# Patient Record
Sex: MALE | Race: WHITE
[De-identification: names, ages, dates, MRNs, and addresses within clinical notes are randomized per-mention and may not be internally consistent; named-entity substitution may affect disease eponyms.]

---

## 2017-07-14 ENCOUNTER — HOSPITAL ENCOUNTER (EMERGENCY)
Dept: HOSPITAL 5 - ED | Age: 42
Discharge: HOME | End: 2017-07-14
Payer: SELF-PAY

## 2017-07-14 VITALS — DIASTOLIC BLOOD PRESSURE: 93 MMHG | SYSTOLIC BLOOD PRESSURE: 179 MMHG

## 2017-07-14 DIAGNOSIS — K02.9: Primary | ICD-10-CM

## 2017-07-14 PROCEDURE — 99282 EMERGENCY DEPT VISIT SF MDM: CPT

## 2017-07-14 NOTE — EMERGENCY DEPARTMENT REPORT
ED ENT HPI





- General


Chief complaint: Dental/Oral


Stated complaint: TOOTH ABCESS


Time Seen by Provider: 17 12:27


Source: patient


Mode of arrival: Ambulatory


Limitations: No Limitations





- History of Present Illness


Initial comments: 


42-year-old malesignificant past medical history other than smoking presents 

with complaint of left-sided toothache since yesterday.  Patient states he has 

had dental cavities which she has not yet dressed in his left upper dental 

molar region.  Denies any pus or blood drainage from mouth patient speaking in 

full sentences denies any fever or chills, states that his gumline feel 

swollen.  This episode started yesterday states he plans on following up with a 

dentist within the next 2 weeks.  Complaining of mild dental pain.  Patient 

denies headache dizziness nausea or abdominal pain chest pain shortness of 

breath.  A O 3


MD complaint: tooth pain


Onset/Timin


-: days(s)


Location: tooth # (13, 14)


Severity: moderate


Severity scale (0 -10): 5


Quality: aching


Consistency: intermittent


Worsens with: swallowing, eating


Context- Dental: history of dental caries, poor dental care





- Related Data


 Previous Rx's











 Medication  Instructions  Recorded  Last Taken  Type


 


Amoxicillin [Trimox CAP] 500 mg PO Q8H #30 capsule 17 Unknown Rx


 


Benzocaine [Orajel Liquid 20%] 1 ml MM Q6HR PRN #1 bottle 17 Unknown Rx


 


Chlorhexidine Mouthwash [Peridex] 118 ml MM BID #1 bottle 17 Unknown Rx


 


Ibuprofen [Motrin] 800 mg PO Q8HR PRN #25 tablet 17 Unknown Rx











 Allergies











Allergy/AdvReac Type Severity Reaction Status Date / Time


 


No Known Allergies Allergy   Unverified 17 11:50














ED Dental HPI





- General


Chief complaint: Dental/Oral


Stated complaint: TOOTH ABCESS


Time Seen by Provider: 17 12:27


Source: patient


Mode of arrival: Ambulatory


Limitations: No Limitations





- Related Data


 Previous Rx's











 Medication  Instructions  Recorded  Last Taken  Type


 


Amoxicillin [Trimox CAP] 500 mg PO Q8H #30 capsule 17 Unknown Rx


 


Benzocaine [Orajel Liquid 20%] 1 ml MM Q6HR PRN #1 bottle 17 Unknown Rx


 


Chlorhexidine Mouthwash [Peridex] 118 ml MM BID #1 bottle 17 Unknown Rx


 


Ibuprofen [Motrin] 800 mg PO Q8HR PRN #25 tablet 17 Unknown Rx











 Allergies











Allergy/AdvReac Type Severity Reaction Status Date / Time


 


No Known Allergies Allergy   Unverified 17 11:50














ED Review of Systems


ROS: 


Stated complaint: TOOTH ABCESS


Other details as noted in HPI





Constitutional: denies: chills, fever


Eyes: denies: eye pain, eye discharge, vision change


ENT: denies: ear pain, throat pain


Respiratory: denies: cough, shortness of breath, wheezing


Cardiovascular: denies: chest pain, palpitations


Endocrine: no symptoms reported


Gastrointestinal: denies: abdominal pain, nausea, diarrhea


Genitourinary: denies: urgency, dysuria


Musculoskeletal: denies: back pain, joint swelling, arthralgia


Skin: denies: rash, lesions


Neurological: denies: headache, weakness, paresthesias


Psychiatric: denies: anxiety, depression


Hematological/Lymphatic: denies: easy bleeding, easy bruising





ED Past Medical Hx





- Past Medical History


Previous Medical History?: No





- Surgical History


Past Surgical History?: No





- Social History


Smoking Status: Unknown if ever smoked


Substance Use Type: None





- Medications


Home Medications: 


 Home Medications











 Medication  Instructions  Recorded  Confirmed  Last Taken  Type


 


Amoxicillin [Trimox CAP] 500 mg PO Q8H #30 capsule 17  Unknown Rx


 


Benzocaine [Orajel Liquid 20%] 1 ml MM Q6HR PRN #1 bottle 17  Unknown Rx


 


Chlorhexidine Mouthwash [Peridex] 118 ml MM BID #1 bottle 17  Unknown Rx


 


Ibuprofen [Motrin] 800 mg PO Q8HR PRN #25 tablet 17  Unknown Rx














ED Physical Exam





- General


Limitations: No Limitations


General appearance: alert, in no apparent distress





- Head


Head exam: Present: atraumatic, normocephalic





- Eye


Eye exam: Present: normal appearance, PERRL, EOMI





- ENT


ENT exam: Present: mucous membranes moist





- Expanded ENT Exam


  ** Expanded


Teeth exam: Present: dental caries, dental tenderness # ([id number])





  __________________________














  __________________________





 1 - Dental Tenderness (pain in teeth 13 1415, visible damage to teeth with 

severe cavities)








- Neck


Neck exam: Present: normal inspection, full ROM





- Respiratory


Respiratory exam: Present: normal lung sounds bilaterally.  Absent: respiratory 

distress





- Cardiovascular


Cardiovascular Exam: Present: regular rate, normal rhythm.  Absent: systolic 

murmur, diastolic murmur, rubs, gallop





- GI/Abdominal


GI/Abdominal exam: Present: soft, normal bowel sounds





- Rectal


Rectal exam: Present: deferred





- Extremities Exam


Extremities exam: Present: normal inspection





- Back Exam


Back exam: Present: normal inspection





- Neurological Exam


Neurological exam: Present: alert, oriented X3, CN II-XII intact, normal gait





- Psychiatric


Psychiatric exam: Present: normal affect, normal mood





- Skin


Skin exam: Present: warm, dry, intact, normal color.  Absent: rash





ED Course


 Vital Signs











  17





  11:47


 


Temperature 98.6 F


 


Pulse Rate 89


 


Respiratory 18





Rate 


 


Blood Pressure 175/112


 


O2 Sat by Pulse 100





Oximetry 














ED Medical Decision Making





- Medical Decision Making


A/P: dental cavities, toothache, dental abscess


1- Motrin when necessary, amoxicillin ten-day course, Orajel when necessary, 

Peridex mouthwash daily basis, 


2- I provided patient with information for multiple dental clinics to follow up 

and stressed the importance of dental follow-up as he has multiple cavities 

that require dental fixation or instrumentation


3- no clinical signs of facial abscess, no Ry's angina, no induration or 

cellulitis of floor of mouth or tongue


4- patient able to tolerate by mouth before discharge


5- no signs of facial infection.  Advised patient that if he does not take 

antibiotics with follow-up with a dentist as soon as possible that a can result 

in potentially serious or dangerous infection to develop in jaw or face.  

Patient states that he understood these instructions.  I advised patient to 

return to the ED for any persistent unrelenting nausea or vomiting fever or 

chills or headaches, pus or blood drainage from mouth.


Critical care attestation.: 


If time is entered above; I have spent that time in minutes in the direct care 

of this critically ill patient, excluding procedure time.








ED Disposition


Clinical Impression: 


 Pain due to dental caries





Disposition:  TO HOME OR SELFCARE


Is pt being admited?: No


Does the pt Need Aspirin: No


Condition: Stable


Instructions:  Dental Abscess (ED), Dental Caries (ED), Hypertension (ED), 

Toothache (ED)


Additional Instructions: 


[email or web address]


Prescriptions: 


Amoxicillin [Trimox CAP] 500 mg PO Q8H #30 capsule


Benzocaine [Orajel Liquid 20%] 1 ml MM Q6HR PRN #1 bottle


 PRN Reason: Toothache


Chlorhexidine Mouthwash [Peridex] 118 ml MM BID #1 bottle


Ibuprofen [Motrin] 800 mg PO Q8HR PRN #25 tablet


 PRN Reason: Pain


Referrals: 


[facility] [Outside] - 3-5 Days


JONATHAN BARRIENTOS MD [Staff Physician] - 3-5 Days


[facility] [Provider Group] - 3-5 Days


[facility] [Provider Group] - 3-5 Days


Forms:  Work/School Release Form(ED)


Time of Disposition: 12:51

## 2018-09-19 ENCOUNTER — HOSPITAL ENCOUNTER (INPATIENT)
Dept: HOSPITAL 5 - ED | Age: 43
LOS: 5 days | Discharge: HOME HEALTH | DRG: 871 | End: 2018-09-24
Attending: INTERNAL MEDICINE | Admitting: INTERNAL MEDICINE
Payer: COMMERCIAL

## 2018-09-19 DIAGNOSIS — F12.90: ICD-10-CM

## 2018-09-19 DIAGNOSIS — K57.30: ICD-10-CM

## 2018-09-19 DIAGNOSIS — N17.0: ICD-10-CM

## 2018-09-19 DIAGNOSIS — A41.9: Primary | ICD-10-CM

## 2018-09-19 DIAGNOSIS — K57.20: ICD-10-CM

## 2018-09-19 DIAGNOSIS — R65.10: ICD-10-CM

## 2018-09-19 LAB
ALBUMIN SERPL-MCNC: 3.4 G/DL (ref 3.9–5)
ALT SERPL-CCNC: 13 UNITS/L (ref 7–56)
BASOPHILS # (AUTO): 0 K/MM3 (ref 0–0.1)
BASOPHILS NFR BLD AUTO: 0.2 % (ref 0–1.8)
BILIRUB UR QL STRIP: (no result)
BLOOD UR QL VISUAL: (no result)
BUN SERPL-MCNC: 61 MG/DL (ref 9–20)
BUN/CREAT SERPL: 28 %
CALCIUM SERPL-MCNC: 9.2 MG/DL (ref 8.4–10.2)
EOSINOPHIL # BLD AUTO: 0 K/MM3 (ref 0–0.4)
EOSINOPHIL NFR BLD AUTO: 0 % (ref 0–4.3)
HCT VFR BLD CALC: 45.3 % (ref 35.5–45.6)
HEMOLYSIS INDEX: 12
HGB BLD-MCNC: 15.1 GM/DL (ref 11.8–15.2)
LYMPHOCYTES # BLD AUTO: 0.5 K/MM3 (ref 1.2–5.4)
LYMPHOCYTES NFR BLD AUTO: 4 % (ref 13.4–35)
MCH RBC QN AUTO: 30 PG (ref 28–32)
MCHC RBC AUTO-ENTMCNC: 33 % (ref 32–34)
MCV RBC AUTO: 91 FL (ref 84–94)
MONOCYTES # (AUTO): 1.1 K/MM3 (ref 0–0.8)
MONOCYTES % (AUTO): 9.5 % (ref 0–7.3)
MUCOUS THREADS #/AREA URNS HPF: (no result) /HPF
PH UR STRIP: 5 [PH] (ref 5–7)
PLATELET # BLD: 321 K/MM3 (ref 140–440)
RBC # BLD AUTO: 4.97 M/MM3 (ref 3.65–5.03)
RBC #/AREA URNS HPF: 2 /HPF (ref 0–6)
UROBILINOGEN UR-MCNC: < 2 MG/DL (ref ?–2)
WBC #/AREA URNS HPF: 5 /HPF (ref 0–6)

## 2018-09-19 PROCEDURE — 80048 BASIC METABOLIC PNL TOTAL CA: CPT

## 2018-09-19 PROCEDURE — C9113 INJ PANTOPRAZOLE SODIUM, VIA: HCPCS

## 2018-09-19 PROCEDURE — 80053 COMPREHEN METABOLIC PANEL: CPT

## 2018-09-19 PROCEDURE — 85025 COMPLETE CBC W/AUTO DIFF WBC: CPT

## 2018-09-19 PROCEDURE — 96375 TX/PRO/DX INJ NEW DRUG ADDON: CPT

## 2018-09-19 PROCEDURE — 96365 THER/PROPH/DIAG IV INF INIT: CPT

## 2018-09-19 PROCEDURE — 10160 PNXR ASPIR ABSC HMTMA BULLA: CPT

## 2018-09-19 PROCEDURE — C1769 GUIDE WIRE: HCPCS

## 2018-09-19 PROCEDURE — 82962 GLUCOSE BLOOD TEST: CPT

## 2018-09-19 PROCEDURE — 81001 URINALYSIS AUTO W/SCOPE: CPT

## 2018-09-19 PROCEDURE — 87040 BLOOD CULTURE FOR BACTERIA: CPT

## 2018-09-19 PROCEDURE — 77012 CT SCAN FOR NEEDLE BIOPSY: CPT

## 2018-09-19 PROCEDURE — 96361 HYDRATE IV INFUSION ADD-ON: CPT

## 2018-09-19 PROCEDURE — 36415 COLL VENOUS BLD VENIPUNCTURE: CPT

## 2018-09-19 PROCEDURE — 87116 MYCOBACTERIA CULTURE: CPT

## 2018-09-19 PROCEDURE — 74177 CT ABD & PELVIS W/CONTRAST: CPT

## 2018-09-19 PROCEDURE — 82140 ASSAY OF AMMONIA: CPT

## 2018-09-19 PROCEDURE — 85027 COMPLETE CBC AUTOMATED: CPT

## 2018-09-19 PROCEDURE — 74176 CT ABD & PELVIS W/O CONTRAST: CPT

## 2018-09-19 PROCEDURE — 96372 THER/PROPH/DIAG INJ SC/IM: CPT

## 2018-09-19 RX ADMIN — Medication SCH ML: at 23:18

## 2018-09-19 RX ADMIN — METRONIDAZOLE SCH MLS/HR: 5 INJECTION, SOLUTION INTRAVENOUS at 23:18

## 2018-09-19 RX ADMIN — SODIUM CHLORIDE SCH MLS/HR: 0.9 INJECTION, SOLUTION INTRAVENOUS at 18:54

## 2018-09-19 NOTE — CONSULTATION
History of Present Illness


Consult date: 09/19/18


Chief complaint: 





abdominal pain





- History of present illness


History of present illness: 





42 yo M with no PMHx presents to ER with 2 days of crampy suprapubic abdominal 

pain. He states that the pain was moderate in intensity upon onset and has 

gradually improved. It does not radiate. The pain was associated with nausea 

and so the patient forced himself to drink water and vomit until the emesis was 

clear. He also tried some charcoal to help with the pain. He states he was 

having normal BMs and the last BM was loose. He is passing flatus. He admits to 

intermittent chills. No measured fever. No sick contacts. He has never had 

symptoms like this in the past. He has never had a colonoscopy. He also admits 

to pain with urination and difficulty urinating. He has not had a solid meal in 

2 days but has been drinking water.





At this time the patient states his pain is better. He is not nauseated. 





Past History


Past Medical History: No medical history


Past Surgical History: No surgical history


Social history: smoking (marijuana).  denies: alcohol abuse, prescription drug 

abuse


Family history: no significant family history (no family history of colon or Gi 

malignancy)





Medications and Allergies


 Allergies











Allergy/AdvReac Type Severity Reaction Status Date / Time


 


No Known Allergies Allergy   Unverified 07/14/17 11:50











 Home Medications











 Medication  Instructions  Recorded  Confirmed  Last Taken  Type


 


No Known Home Medications [No  09/19/18 09/19/18 Unknown History





Reported Home Medications]     











Active Meds: 


Active Medications





Acetaminophen (Tylenol)  650 mg PO Q4H PRN


   PRN Reason: Pain MILD(1-3)/Fever >100.5/HA


Albuterol (Proventil)  2.5 mg IH Q4HRT PRN


   PRN Reason: Shortness Of Breath


Piperacillin Sod/Tazobactam Sod (Zosyn/Ns 4.5gm/100ml)  4.5 gm in 100 mls @ 200 

mls/hr IV ONCE DAGO


   Last Admin: 09/19/18 15:31 Dose:  200 mls/hr


Levofloxacin/Dextrose (Levaquin 500mg/100ml)  500 mg in 100 mls @ 100 mls/hr IV 

PREOP NR; Protocol


   Stop: 09/20/18 06:00


Metronidazole (Flagyl 500 Mg/100 Ml)  500 mg in 100 mls @ 200 mls/hr IV ONCE DAGO

; Protocol


Sodium Chloride (Nacl 0.9% 1000 Ml)  1,000 mls @ 150 mls/hr IV ONCE ONE


   Stop: 09/19/18 22:06


Metronidazole (Flagyl 500 Mg/100 Ml)  500 mg in 100 mls @ 100 mls/hr IV Q8HR DAGO

; Protocol


Sodium Chloride (Nacl 0.9% 1000 Ml)  1,000 mls @ 125 mls/hr IV AS DIRECT DAGO


Levofloxacin/Dextrose (Levaquin 500mg/100ml)  500 mg in 100 mls @ 100 mls/hr IV 

Q24HR DAGO; Protocol


Morphine Sulfate (Morphine)  2 mg IV Q4H PRN


   PRN Reason: Pain, Moderate (4-6)


Ondansetron HCl (Zofran)  4 mg IV Q8H PRN


   PRN Reason: Nausea And Vomiting


Sodium Chloride (Sodium Chloride Flush Syringe 10 Ml)  10 ml IV BID DAGO


Sodium Chloride (Sodium Chloride Flush Syringe 10 Ml)  10 ml IV PRN PRN


   PRN Reason: LINE FLUSH











Review of Systems


All systems: negative (10 pt ROS performed and negative except for that listed 

in HPI)





Exam


 Vital Signs











Temp Pulse Resp BP Pulse Ox


 


 97.9 F   115 H  16   139/93   93 


 


 09/19/18 12:12  09/19/18 12:12  09/19/18 12:12  09/19/18 12:12  09/19/18 12:12











Narrative exam: 





Gen: AAOx3. NAD


ENT: no scleral icterus or conjunctival pallor


CV: S1, s2+


Resp: even and unlabored


Abd: soft, ND, mild TTP in suprapubic region and LLQ. no r/r/g


Ext: no c/c/e





Results





- Labs





 09/19/18 13:15





 09/19/18 13:15


 Abnormal lab results











  09/19/18 09/19/18 Range/Units





  13:15 13:15 


 


WBC  11.5 H   (4.5-11.0)  K/mm3


 


Lymph % (Auto)  4.0 L   (13.4-35.0)  %


 


Mono % (Auto)  9.5 H   (0.0-7.3)  %


 


Lymph #  0.5 L   (1.2-5.4)  K/mm3


 


Mono #  1.1 H   (0.0-0.8)  K/mm3


 


Seg Neutrophils %  86.3 H   (40.0-70.0)  %


 


Seg Neutrophils #  9.9 H   (1.8-7.7)  K/mm3


 


Sodium   136 L  (137-145)  mmol/L


 


Chloride   91.3 L  ()  mmol/L


 


BUN   61 H  (9-20)  mg/dL


 


Creatinine   2.2 H  (0.8-1.5)  mg/dL


 


Glucose   128 H  ()  mg/dL


 


Albumin   3.4 L  (3.9-5)  g/dL








 Diabetes panel











  09/19/18 Range/Units





  13:15 


 


Sodium  136 L  (137-145)  mmol/L


 


Potassium  4.0  (3.6-5.0)  mmol/L


 


Chloride  91.3 L  ()  mmol/L


 


Carbon Dioxide  26  (22-30)  mmol/L


 


BUN  61 H  (9-20)  mg/dL


 


Creatinine  2.2 H  (0.8-1.5)  mg/dL


 


Glucose  128 H  ()  mg/dL


 


Calcium  9.2  (8.4-10.2)  mg/dL


 


AST  16  (5-40)  units/L


 


ALT  13  (7-56)  units/L


 


Alkaline Phosphatase  56  ()  units/L


 


Total Protein  8.2  (6.3-8.2)  g/dL


 


Albumin  3.4 L  (3.9-5)  g/dL








 Calcium panel











  09/19/18 Range/Units





  13:15 


 


Calcium  9.2  (8.4-10.2)  mg/dL


 


Albumin  3.4 L  (3.9-5)  g/dL








 Pituitary panel











  09/19/18 Range/Units





  13:15 


 


Sodium  136 L  (137-145)  mmol/L


 


Potassium  4.0  (3.6-5.0)  mmol/L


 


Chloride  91.3 L  ()  mmol/L


 


Carbon Dioxide  26  (22-30)  mmol/L


 


BUN  61 H  (9-20)  mg/dL


 


Creatinine  2.2 H  (0.8-1.5)  mg/dL


 


Glucose  128 H  ()  mg/dL


 


Calcium  9.2  (8.4-10.2)  mg/dL








 Adrenal panel











  09/19/18 Range/Units





  13:15 


 


Sodium  136 L  (137-145)  mmol/L


 


Potassium  4.0  (3.6-5.0)  mmol/L


 


Chloride  91.3 L  ()  mmol/L


 


Carbon Dioxide  26  (22-30)  mmol/L


 


BUN  61 H  (9-20)  mg/dL


 


Creatinine  2.2 H  (0.8-1.5)  mg/dL


 


Glucose  128 H  ()  mg/dL


 


Calcium  9.2  (8.4-10.2)  mg/dL


 


Total Bilirubin  0.90  (0.1-1.2)  mg/dL


 


AST  16  (5-40)  units/L


 


ALT  13  (7-56)  units/L


 


Alkaline Phosphatase  56  ()  units/L


 


Total Protein  8.2  (6.3-8.2)  g/dL


 


Albumin  3.4 L  (3.9-5)  g/dL














- Imaging


CT scan - abdomen: report reviewed, image reviewed


CT scan - pelvis: report reviewed, image reviewed





Assessment and Plan





42 yo M with 





1. pneumoperitoneum, likely perforated diverticulitis


2. ALONSO likely secondary to dehydration





Plan;





1. Based on the patients clinical findings, he is stable without peritoneal 

signs or significant abdominal pain. The perforation has likely sealed off. We 

will admit patient to hospitalist service


2. strict NPO


3. IVF - NS@150cc/hr. Pt received multiple 1L NS boluses in ER


4. prn pain and nausea control


5. repeat CT scan A/P with oral, rectal, and IV contrast (if crt improves with 

resuscitation) in AM


6. DVT ppx


7. activity as tolerated


8. serial abdominal exams. If patient does not improve clinically or abdominal 

exam/labs worsen, he may require diagnostic laparoscopy, washout. I discussed 

the options of surgery and conservative management with the patient and he 

understands and is agreeable to the plan.





Thank you for this consultation, please call with questions or concerns.

## 2018-09-19 NOTE — CAT SCAN REPORT
CT ABDOMEN PELVIS WITHOUT CONTRAST:



HISTORY:  Suprapubic pain, nausea, vomiting and diarrhea.



COMPARISON: none.



TECHNIQUE:  Helical CT in 1.25mm intervals without IV contrast. 

Sagittal and coronal reconstructions. 





FINDINGS:



Lung bases: Normal.



Liver: Normal.



Biliary system: There is suggestion of some sludge layering in the 

gallbladder. No calcified gallstones. No biliary dilatation or 

inflammation.



Pancreas: Normal.



Spleen: Normal.



Kidneys/ureters/bladder: The kidneys are normal size, contour and 

position. A punctate calyceal stone is identified at the inferior pole 

the right kidney. A 1 cm cyst is noted near mid pole of the right 

kidney. No evidence for ureteral stones or hydronephrosis. The bladder 

is partially complex but diffuse bladder wall thickening is evident 

measuring up to 1.2 cm. Cystitis could be considered.



Adrenal glands: Normal.



Aorta: Normal.



Intestines: No oral contrast was administered which limits this exam. 

Free air is identified along the anterior abdominal wall spanning from 

the liver to the pelvis. Bowel perforation is highly suspected. The 

site of perforation appears to be in the sigmoid region where there are 

numerous diverticula. The sigmoid colon is thickened as well. A 

perforated diverticulitis could be considered.



Appendix: Normal.



Ascites: There is small to medium fluid in the pelvis and between bowel 

loops in the mesentery. No obvious encapsulated mature abscess.



Adenopathy: None.



Musculoskeletal: Normal.





IMPRESSION:

Free air in the abdomen consistent with visceral perforation. I suspect 

this may be secondary to a perforated diverticulum or diverticulitis.

Punctate right renal calculus.

Thickened bladder wall, correlate for cystitis.

Mild degree of fluid in the abdomen as described.

Probable sludge in the gallbladder.



These findings were discussed with Dr. Walls in the emergency 

department at 1404 hrs.

## 2018-09-19 NOTE — HISTORY AND PHYSICAL REPORT
History of Present Illness


Chief complaint: 





My stomach hurts


History of present illness: 


42 YO Male with No PMH presents to ED for evaluation. Pt states that he has 

experienced abdominal pain over the past 3 days with persistent symptoms over 

the same time frame. Pt states that pain is 6/10, crampy in nature, associated 

with nausea, and 3-4 episodes of vomiting, and one loose stools. Pt denies fever

, chills, CP, Palpitations, Syncope, Trauma, BRBPR, unintentional weight loss, 

night sweats, BRBPR, hematuria, flank pain, or recent ill contacts. Pt seen and 

evaluated in ED and found to have SIRS, ARF, Diverticulosis with Perforated 

Viscus. Surgery consulted in ED. 








Past History


Past Medical History: No medical history, other (reviewed)


Past Surgical History: No surgical history, Other (reviewed)


Social history: single.  denies: smoking, alcohol abuse, prescription drug abuse


Family history: no significant family history (reviewed)





Medications and Allergies


 Allergies











Allergy/AdvReac Type Severity Reaction Status Date / Time


 


No Known Allergies Allergy   Unverified 07/14/17 11:50











 Home Medications











 Medication  Instructions  Recorded  Confirmed  Last Taken  Type


 


No Known Home Medications [No  09/19/18 09/19/18 Unknown History





Reported Home Medications]     











Active Meds: 


Active Medications





Piperacillin Sod/Tazobactam Sod (Zosyn/Ns 4.5gm/100ml)  4.5 gm in 100 mls @ 200 

mls/hr IV ONCE DAGO


Sodium Chloride (Nacl 0.9% 1000 Ml)  1,000 mls @ 999 mls/hr IV BOLUS ONE


   Stop: 09/19/18 15:27











Review of Systems


Constitutional: no weight loss, no weight gain, no fever, no chills, no sweats


Ears, nose, mouth and throat: no ear pain, no ear discharge, no tinnitis, no 

decreased hearing, no nose pain, no nasal congestion, no nasal discharge, no 

sinus pressure


Cardiovascular: no chest pain, no orthopnea, no palpitations, no rapid/

irregular heart beat, no edema, no syncope


Respiratory: no cough, no cough with sputum, no excessive sputum, no hemoptysis


Gastrointestinal: nausea, vomiting


Rectal: no pain, no incontinence, no bleeding


Musculoskeletal: no neck stiffness, no neck pain, no shooting arm pain, no arm 

numbness/tingling, no low back pain


Integumentary: no rash, no pruritis, no redness, no sores, no wounds


Neurological: no head injury, no transient paralysis, no paralysis, no weakness

, no parathesias, no numbness, no tingling


Psychiatric: no anxiety, no memory loss, no change in sleep habits, no sleep 

disturbances, no insomnia, no hypersomnia, no change in appetite, no change in 

libido


Endocrine: no cold intolerance, no heat intolerance, no polyphagia, no 

excessive thirst, no polydipsia, no polyuria, no nocturia, no excessive sweating


Hematologic/Lymphatic: no easy bruising, no easy bleeding, no lymphadenopathy, 

no lymphedema


Allergic/Immunologic: no urticaria, no allergic rhinitis, no wheezing, no 

persistent infections, no anaphylaxis





Exam





- Constitutional


Vitals: 


 











Temp Pulse Resp BP Pulse Ox


 


 97.9 F   115 H  18   139/93   93 


 


 09/19/18 12:12  09/19/18 12:12  09/19/18 13:32  09/19/18 12:12  09/19/18 12:12











General appearance: Present: mild distress





- EENT


Eyes: Present: PERRL


ENT: hearing intact, clear oral mucosa





- Neck


Neck: Present: supple, normal ROM





- Respiratory


Respiratory effort: normal


Respiratory: bilateral: CTA





- Cardiovascular


Heart Sounds: Present: S1 & S2.  Absent: rub, click





- Extremities


Extremities: pulses symmetrical, No edema


Peripheral Pulses: within normal limits





- Abdominal


General gastrointestinal: Present: soft, non-tender, non-distended, normal 

bowel sounds


Male genitourinary: Present: normal





- Integumentary


Integumentary: Present: clear, warm, dry





- Musculoskeletal


Musculoskeletal: gait normal, strength equal bilaterally





- Psychiatric


Psychiatric: appropriate mood/affect, intact judgment & insight





- Neurologic


Neurologic: CNII-XII intact, moves all extremities





Results





- Labs


CBC & Chem 7: 


 09/19/18 13:15





 09/19/18 13:15


Labs: 


 Abnormal lab results











  09/19/18 09/19/18 Range/Units





  13:15 13:15 


 


WBC  11.5 H   (4.5-11.0)  K/mm3


 


Lymph % (Auto)  4.0 L   (13.4-35.0)  %


 


Mono % (Auto)  9.5 H   (0.0-7.3)  %


 


Lymph #  0.5 L   (1.2-5.4)  K/mm3


 


Mono #  1.1 H   (0.0-0.8)  K/mm3


 


Seg Neutrophils %  86.3 H   (40.0-70.0)  %


 


Seg Neutrophils #  9.9 H   (1.8-7.7)  K/mm3


 


Sodium   136 L  (137-145)  mmol/L


 


Chloride   91.3 L  ()  mmol/L


 


BUN   61 H  (9-20)  mg/dL


 


Creatinine   2.2 H  (0.8-1.5)  mg/dL


 


Glucose   128 H  ()  mg/dL


 


Albumin   3.4 L  (3.9-5)  g/dL














Assessment and Plan





- Patient Problems


(1) SIRS (systemic inflammatory response syndrome)


Current Visit: Yes   Status: Acute   


Plan to address problem: 


IV antibiotic therapy, blood cultures, CBC, CMP, chest x ray, urinalysis, 

serial lactic acid.








(2) Diverticulosis


Current Visit: Yes   Status: Acute   


Qualifiers: 


   Diverticulosis site: diverticulosis of large intestine 


Plan to address problem: 


CT Abdomen pelvis, IVF resuscitation, serial abdominal exam, NPO, 








(3) Acute renal failure


Current Visit: Yes   Status: Acute   


Qualifiers: 


   Acute renal failure type: with acute tubular necrosis   Qualified Code(s): 

N17.0 - Acute kidney failure with tubular necrosis   


Plan to address problem: 


IVF resuscitation, monitor uop q shift, supportive care.








(4) Perforated diverticulum


Current Visit: Yes   Status: Acute   


Plan to address problem: 


CT Abdomen pelvis, serial abdominal exam, surgery consulted, NPO, IVF 

resuscitation. 








(5) DVT prophylaxis


Current Visit: Yes   Status: Acute   


Plan to address problem: 


SCD to BLE while in bed.

## 2018-09-19 NOTE — ANESTHESIA CONSULTATION
Anesthesia Consult and Med Hx


Date of service: 09/19/18





- Airway


Anesthetic Teeth Evaluation: Poor


ROM Head & Neck: Adequate


Mental/Hyoid Distance: Adequate


Mallampati Class: Class I


Intubation Access Assessment: Good





- Pulmonary Exam


CTA: No (exp rhoncii)





- Cardiac Exam


Cardiac Exam: RRR





- Pre-Operative Health Status


ASA Pre-Surgery Classification: ASA2


Proposed Anesthetic Plan: MAC





- Pre-Anesthesia Comment


Pre-Anesthesia Comments: admitted with abdominal pain. CT impression-free air 

consistent with perforated visceral, may be secondary to diverticulum/

diverticulitis.





- Pulmonary


Hx Smoking: Yes (marijuana )


Hx Respiratory Symptoms: Yes (recent cold 7 days ago, productive cough )





- Endocrine


Hx Renal Disease: No (elevated creatinine, 2.2 )

## 2018-09-19 NOTE — EMERGENCY DEPARTMENT REPORT
ED Abdominal Pain HPI





- General


Chief Complaint: Abdominal Pain


Stated Complaint: LOWER ABD PAIN


Time Seen by Provider: 18 12:40


Source: patient


Mode of arrival: Wheelchair


Limitations: No Limitations





- History of Present Illness


Initial Comments: 








Patient is a 43-year-old Male who's had 3 days of central suprapubic abdominal 

discomfort.  Patient states a crampy type pain.  He's had 3-4 episodes of 

vomiting.  Patient also had one bout of loose stools this morning.  Patient 

states 3 days ago he had one episode of dysuria that was was excruciating 

however this is resolved.  Patient denies any fevers chills at this time.  


MD Complaint: abdominal pain


-: Gradual (3 days)


Severity scale (0 -10): 6


Improves With: nothing


Worsens With: nothing


Associated Symptoms: nausea, vomiting, diarrhea.  denies: hematemesis, 

hematochezia





- Related Data


 Home Medications











 Medication  Instructions  Recorded  Confirmed  Last Taken


 


No Known Home Medications [No  18 Unknown





Reported Home Medications]    











 Allergies











Allergy/AdvReac Type Severity Reaction Status Date / Time


 


No Known Allergies Allergy   Unverified 17 11:50














ED Review of Systems


ROS: 


Stated complaint: LOWER ABD PAIN


Other details as noted in HPI





Comment: All other systems reviewed and negative





ED Past Medical Hx





- Past Medical History


Previous Medical History?: No





- Surgical History


Past Surgical History?: No





- Social History


Smoking Status: Never Smoker


Substance Use Type: Marijuana





- Medications


Home Medications: 


 Home Medications











 Medication  Instructions  Recorded  Confirmed  Last Taken  Type


 


No Known Home Medications [No  18 Unknown History





Reported Home Medications]     














ED Physical Exam





- General


Limitations: No Limitations


General appearance: alert, in no apparent distress, cachectic





- Head


Head exam: Present: atraumatic, normocephalic





- Eye


Eye exam: Present: normal appearance





- ENT


ENT exam: Present: mucous membranes moist





- Neck


Neck exam: Present: normal inspection





- Respiratory


Respiratory exam: Present: normal lung sounds bilaterally.  Absent: respiratory 

distress, wheezes, rales, rhonchi





- Cardiovascular


Cardiovascular Exam: Present: regular rate, normal rhythm.  Absent: systolic 

murmur, diastolic murmur, rubs, gallop





- GI/Abdominal


GI/Abdominal exam: Present: soft, tenderness (suprapubic), hypoactive bowel 

sounds.  Absent: distended, guarding, rebound, rigid, normal bowel sounds





- Rectal


Rectal exam: Present: deferred





- Extremities Exam


Extremities exam: Present: normal inspection





- Back Exam


Back exam: Present: normal inspection





- Neurological Exam


Neurological exam: Present: alert, oriented X3





- Psychiatric


Psychiatric exam: Present: normal affect, normal mood





- Skin


Skin exam: Present: warm, dry, intact, normal color.  Absent: rash





ED Course


 Vital Signs











  18





  12:12 12:34 13:19


 


Temperature 97.9 F  


 


Pulse Rate 115 H  


 


Respiratory 16 14 15





Rate   


 


Blood Pressure 139/93  


 


O2 Sat by Pulse 93  





Oximetry   














  18





  13:32


 


Temperature 


 


Pulse Rate 


 


Respiratory 18





Rate 


 


Blood Pressure 


 


O2 Sat by Pulse 





Oximetry 














ED Medical Decision Making





- Lab Data


Result diagrams: 


 18 13:15





 18 13:15








 Lab Results











  18 Range/Units





  13:15 13:15 13:49 


 


WBC  11.5 H    (4.5-11.0)  K/mm3


 


RBC  4.97    (3.65-5.03)  M/mm3


 


Hgb  15.1    (11.8-15.2)  gm/dl


 


Hct  45.3    (35.5-45.6)  %


 


MCV  91    (84-94)  fl


 


MCH  30    (28-32)  pg


 


MCHC  33    (32-34)  %


 


RDW  14.0    (13.2-15.2)  %


 


Plt Count  321    (140-440)  K/mm3


 


Lymph % (Auto)  4.0 L    (13.4-35.0)  %


 


Mono % (Auto)  9.5 H    (0.0-7.3)  %


 


Eos % (Auto)  0.0    (0.0-4.3)  %


 


Baso % (Auto)  0.2    (0.0-1.8)  %


 


Lymph #  0.5 L    (1.2-5.4)  K/mm3


 


Mono #  1.1 H    (0.0-0.8)  K/mm3


 


Eos #  0.0    (0.0-0.4)  K/mm3


 


Baso #  0.0    (0.0-0.1)  K/mm3


 


Seg Neutrophils %  86.3 H    (40.0-70.0)  %


 


Seg Neutrophils #  9.9 H    (1.8-7.7)  K/mm3


 


Sodium   136 L   (137-145)  mmol/L


 


Potassium   4.0   (3.6-5.0)  mmol/L


 


Chloride   91.3 L   ()  mmol/L


 


Carbon Dioxide   26   (22-30)  mmol/L


 


Anion Gap   23   mmol/L


 


BUN   61 H   (9-20)  mg/dL


 


Creatinine   2.2 H   (0.8-1.5)  mg/dL


 


Estimated GFR   33   ml/min


 


BUN/Creatinine Ratio   28   %


 


Glucose   128 H   ()  mg/dL


 


Calcium   9.2   (8.4-10.2)  mg/dL


 


Total Bilirubin   0.90   (0.1-1.2)  mg/dL


 


AST   16   (5-40)  units/L


 


ALT   13   (7-56)  units/L


 


Alkaline Phosphatase   56   ()  units/L


 


Total Protein   8.2   (6.3-8.2)  g/dL


 


Albumin   3.4 L   (3.9-5)  g/dL


 


Albumin/Globulin Ratio   0.7   %


 


Urine Color    Yellow  (Yellow)  


 


Urine Turbidity    Cloudy  (Clear)  


 


Urine pH    5.0  (5.0-7.0)  


 


Ur Specific Gravity    1.020  (1.003-1.030)  


 


Urine Protein    100 mg/dl  (Negative)  mg/dL


 


Urine Glucose (UA)    Neg  (Negative)  mg/dL


 


Urine Ketones    Tr  (Negative)  mg/dL


 


Urine Blood    Mod  (Negative)  


 


Urine Nitrite    Neg  (Negative)  


 


Urine Bilirubin    Neg  (Negative)  


 


Urine Urobilinogen    < 2.0  (<2.0)  mg/dL


 


Ur Leukocyte Esterase    Neg  (Negative)  


 


Urine WBC (Auto)    5.0  (0.0-6.0)  /HPF


 


Urine RBC (Auto)    2.0  (0.0-6.0)  /HPF


 


U Epithel Cells (Auto)    1.0  (0-13.0)  /HPF


 


Urine Mucus    Few  /HPF














- Radiology Data





Patient: VINICIO CARMONA MR#: [id number] 


: 1975 Acct:[id number] 


Age/Sex: 43 / M ADM Date: 18 


Loc: ED 


Attending Dr: 








Ordering Physician: DANISHA WALLS MD 


Date of Service: 18 


Procedure(s): CT abdomen pelvis wo con 


Accession Number(s): [id number] 





cc: DANISHA WALLS MD 








CT ABDOMEN PELVIS WITHOUT CONTRAST: 





HISTORY: Suprapubic pain, nausea, vomiting and diarrhea. 





COMPARISON: none. 





TECHNIQUE: Helical CT in 1.25mm intervals without IV contrast. 


Sagittal and coronal reconstructions. 








FINDINGS: 





Lung bases: Normal. 





Liver: Normal. 





Biliary system: There is suggestion of some sludge layering in the 


gallbladder. No calcified gallstones. No biliary dilatation or 


inflammation. 





Pancreas: Normal. 





Spleen: Normal. 





Kidneys/ureters/bladder: The kidneys are normal size, contour and 


position. A punctate calyceal stone is identified at the inferior pole 


the right kidney. A 1 cm cyst is noted near mid pole of the right 


kidney. No evidence for ureteral stones or hydronephrosis. The bladder 


is partially complex but diffuse bladder wall thickening is evident 


measuring up to 1.2 cm. Cystitis could be considered. 





Adrenal glands: Normal. 





Aorta: Normal. 





Intestines: No oral contrast was administered which limits this exam. 


Free air is identified along the anterior abdominal wall spanning from 


the liver to the pelvis. Bowel perforation is highly suspected. The 


site of perforation appears to be in the sigmoid region where there are 


numerous diverticula. The sigmoid colon is thickened as well. A 


perforated diverticulitis could be considered. 





Appendix: Normal. 





Ascites: There is small to medium fluid in the pelvis and between bowel 


loops in the mesentery. No obvious encapsulated mature abscess. 





Adenopathy: None. 





Musculoskeletal: Normal. 








IMPRESSION: 


Free air in the abdomen consistent with visceral perforation. I suspect 


this may be secondary to a perforated diverticulum or diverticulitis. 


Punctate right renal calculus. 


Thickened bladder wall, correlate for cystitis. 


Mild degree of fluid in the abdomen as described. 


Probable sludge in the gallbladder. 





These findings were discussed with Dr. Walls in the emergency 


department at 1404 hrs. 





Transcribed By: TTR 


Dictated By: FAHAD RYAN JR, MD 


Electronically Authenticated By: FAHAD RYAN JR, MD 


Signed Date/Time: 18 1409 





- Medical Decision Making





Despite the fact that the patient's abdomen is soft patient's CT does have 

extensive free air consistent with a perforated diverticulum.  The patient has 

slight elevation of white count.  Patient also has some renal failure is 

present.  Patient does not have a history of renal failure.  Patient started on 

aggressive IV fluids.  Patient started on sepsis protocol.  Patient given a 

dose of Zosyn and  with general surgery has been a consulted





Patient to be admitted to the hospitalist service under Dr. Hernandez





Critical Care Time: Yes (30)


Critical care attestation.: 


If time is entered above; I have spent that time in minutes in the direct care 

of this critically ill patient, excluding procedure time.








ED Disposition


Clinical Impression: 


 Perforated diverticulum, Acute renal failure





Disposition:  OP ADMIT IP TO THIS HOSP


Is pt being admited?: Yes


Does the pt Need Aspirin: No


Condition: Stable


Referrals: 


PRIMARY CARE,MD [Primary Care Provider] - 3-5 Days


Time of Disposition: 15:29

## 2018-09-19 NOTE — EMERGENCY DEPARTMENT REPORT
Blank Doc





- Documentation


Documentation: 





Patient is a 43-year-old Male who's had 3 days of central suprapubic abdominal 

discomfort.  Patient states a crampy type pain.  He's had 3-4 episodes of 

vomiting.  Patient also had one bout of loose stools this morning.  Patient 

states 3 days ago he had one episode of dysuria that was was excruciating 

however this is resolved.  Patient denies any fevers chills at this time.  

Patient on focused physical exam does have some suprapubic and right lower 

quadrant discomfort.  There is no rebound or guarding.  Patient will have a CT 

of the abdomen and pelvis performed as well as urinalysis and blood work.  

Patient is given meds for symptomatic relief

## 2018-09-20 LAB
BASOPHILS # (AUTO): 0 K/MM3 (ref 0–0.1)
BASOPHILS NFR BLD AUTO: 0.2 % (ref 0–1.8)
BUN SERPL-MCNC: 50 MG/DL (ref 9–20)
BUN/CREAT SERPL: 36 %
CALCIUM SERPL-MCNC: 8.3 MG/DL (ref 8.4–10.2)
EOSINOPHIL # BLD AUTO: 0 K/MM3 (ref 0–0.4)
EOSINOPHIL NFR BLD AUTO: 0 % (ref 0–4.3)
HCT VFR BLD CALC: 37.7 % (ref 35.5–45.6)
HEMOLYSIS INDEX: 38
HGB BLD-MCNC: 12.7 GM/DL (ref 11.8–15.2)
LYMPHOCYTES # BLD AUTO: 0.4 K/MM3 (ref 1.2–5.4)
LYMPHOCYTES NFR BLD AUTO: 3.3 % (ref 13.4–35)
MCH RBC QN AUTO: 31 PG (ref 28–32)
MCHC RBC AUTO-ENTMCNC: 34 % (ref 32–34)
MCV RBC AUTO: 91 FL (ref 84–94)
MONOCYTES # (AUTO): 1 K/MM3 (ref 0–0.8)
MONOCYTES % (AUTO): 7.6 % (ref 0–7.3)
PLATELET # BLD: 296 K/MM3 (ref 140–440)
RBC # BLD AUTO: 4.16 M/MM3 (ref 3.65–5.03)

## 2018-09-20 RX ADMIN — SODIUM CHLORIDE SCH MLS/HR: 0.9 INJECTION, SOLUTION INTRAVENOUS at 04:10

## 2018-09-20 RX ADMIN — DEXTROSE AND SODIUM CHLORIDE SCH MLS/HR: 5; .9 INJECTION, SOLUTION INTRAVENOUS at 21:30

## 2018-09-20 RX ADMIN — Medication SCH ML: at 21:27

## 2018-09-20 RX ADMIN — METRONIDAZOLE SCH MLS/HR: 5 INJECTION, SOLUTION INTRAVENOUS at 05:28

## 2018-09-20 RX ADMIN — Medication SCH ML: at 09:51

## 2018-09-20 RX ADMIN — METRONIDAZOLE SCH MLS/HR: 5 INJECTION, SOLUTION INTRAVENOUS at 13:43

## 2018-09-20 RX ADMIN — METRONIDAZOLE SCH MLS/HR: 5 INJECTION, SOLUTION INTRAVENOUS at 21:27

## 2018-09-20 RX ADMIN — DEXTROSE AND SODIUM CHLORIDE SCH MLS/HR: 5; .9 INJECTION, SOLUTION INTRAVENOUS at 14:04

## 2018-09-20 NOTE — PROGRESS NOTE
Assessment and Plan





44 yo M with 





1. pneumoperitoneum, likely perforated diverticulitis


2. ALONSO likely secondary to dehydration


3. pelvic fluid, possible abscess





Plan:





Patient continues to remain stable without abdominal pain. WBC mildly increased





1. NPO except ice chips


2. IVF - change to D5NS, BUN/Crt improving


3. prn pain and nausea control


4. repeat CT scan A/P with oral, rectal, and IV contrast in AM 9/21/18


5. DVT ppx


6. activity as tolerated


7. repeat CBC and BMP in am


8. monitor for fevers





Further recs pending Ct scan in AM.





D/W Dr. Giang and case management





Thank you for this consultation, please call with questions or concerns.





Subjective


Date of service: 09/20/18


Narrative: 





Pt seen and examined. States abdominal pain has resolved. He is having flatus 

but has not had a BM. + temp of 100.3 but patient states he had on multiple 

sheets prior to this measurement. He is hungry. No n/v.





Objective


 Vital Signs - 12hr











  09/20/18 09/20/18





  04:41 09:30


 


Temperature 99.9 F H 


 


Pulse Rate 95 H 


 


Respiratory 18 





Rate  


 


Blood Pressure 133/95 


 


O2 Sat by Pulse 94 96





Oximetry  














- General physical appearance


Narrative Exam: 





Gen: AAOx3. NAD


CV: S1, S2+


Resp; even and unlabored


Abd: soft, mildly distended, NT. no r/r/g


Ext: no c/c/e





- Labs





 09/20/18 05:13





 09/20/18 05:13


 Diabetes panel











  09/19/18 09/20/18 Range/Units





  13:15 05:13 


 


Sodium  136 L  141  (137-145)  mmol/L


 


Potassium  4.0  4.1  (3.6-5.0)  mmol/L


 


Chloride  91.3 L  101.6  ()  mmol/L


 


Carbon Dioxide  26  26  (22-30)  mmol/L


 


BUN  61 H  50 H  (9-20)  mg/dL


 


Creatinine  2.2 H  1.4  (0.8-1.5)  mg/dL


 


Glucose  128 H  109 H  ()  mg/dL


 


Calcium  9.2  8.3 L  (8.4-10.2)  mg/dL


 


AST  16   (5-40)  units/L


 


ALT  13   (7-56)  units/L


 


Alkaline Phosphatase  56   ()  units/L


 


Total Protein  8.2   (6.3-8.2)  g/dL


 


Albumin  3.4 L   (3.9-5)  g/dL








 Calcium panel











  09/19/18 09/20/18 Range/Units





  13:15 05:13 


 


Calcium  9.2  8.3 L  (8.4-10.2)  mg/dL


 


Albumin  3.4 L   (3.9-5)  g/dL








 Pituitary panel











  09/19/18 09/20/18 Range/Units





  13:15 05:13 


 


Sodium  136 L  141  (137-145)  mmol/L


 


Potassium  4.0  4.1  (3.6-5.0)  mmol/L


 


Chloride  91.3 L  101.6  ()  mmol/L


 


Carbon Dioxide  26  26  (22-30)  mmol/L


 


BUN  61 H  50 H  (9-20)  mg/dL


 


Creatinine  2.2 H  1.4  (0.8-1.5)  mg/dL


 


Glucose  128 H  109 H  ()  mg/dL


 


Calcium  9.2  8.3 L  (8.4-10.2)  mg/dL








 Adrenal panel











  09/19/18 09/20/18 Range/Units





  13:15 05:13 


 


Sodium  136 L  141  (137-145)  mmol/L


 


Potassium  4.0  4.1  (3.6-5.0)  mmol/L


 


Chloride  91.3 L  101.6  ()  mmol/L


 


Carbon Dioxide  26  26  (22-30)  mmol/L


 


BUN  61 H  50 H  (9-20)  mg/dL


 


Creatinine  2.2 H  1.4  (0.8-1.5)  mg/dL


 


Glucose  128 H  109 H  ()  mg/dL


 


Calcium  9.2  8.3 L  (8.4-10.2)  mg/dL


 


Total Bilirubin  0.90   (0.1-1.2)  mg/dL


 


AST  16   (5-40)  units/L


 


ALT  13   (7-56)  units/L


 


Alkaline Phosphatase  56   ()  units/L


 


Total Protein  8.2   (6.3-8.2)  g/dL


 


Albumin  3.4 L   (3.9-5)  g/dL

## 2018-09-20 NOTE — PROGRESS NOTE
Assessment and Plan


Assessment and plan: 


42 YO Male with No PMH presents to ED for evaluation. Pt states that he has 

experienced abdominal pain over the past 3 days with persistent symptoms over 

the same time frame. Pt states that pain is 6/10, crampy in nature, associated 

with nausea, and 3-4 episodes of vomiting, and one loose stools. Pt denies fever

, chills, CP, Palpitations, Syncope, Trauma, BRBPR, unintentional weight loss, 

night sweats, BRBPR, hematuria, flank pain, or recent ill contacts. Pt seen and 

evaluated in ED and found to have SIRS, ARF, Diverticulosis with Perforated 

Viscus. 








Peritonitis


Penuomperitoneum with possible perforated diverticulitis


ALONSO secondary to vasomotor nephropathy-improving


Sepsis secondary to above


Possible Pelvic abscess





Plan


Continue supportive care


Repeat CT abd and pelvis in am per discussion with surgery


IV abx


Pain control


IVF- D5NS


Encourage ambulation


DVT/GI PROPHY














History


Interval history: 


Patient seen and examined states he is hungry, less pain reported today but has 

been NPO








Hospitalist Physical





- Constitutional


Vitals: 


 











Temp Pulse Resp BP Pulse Ox


 


 99.9 F H  95 H  18   133/95   96 


 


 09/20/18 04:41  09/20/18 04:41  09/20/18 04:41  09/20/18 04:41  09/20/18 09:30











General appearance: Present: mild distress





- EENT


Eyes: Present: PERRL


ENT: hearing intact, clear oral mucosa, dentition normal





- Neck


Neck: Present: supple, normal ROM





- Respiratory


Respiratory effort: normal


Respiratory: bilateral: CTA





- Cardiovascular


Rhythm: regular


Heart Sounds: Present: S1 & S2.  Absent: systolic murmur





- Extremities


Extremities: no ischemia, pulses intact, pulses symmetrical, No edema, normal 

temperature, normal color, Full ROM


Peripheral Pulses: within normal limits





- Abdominal


General gastrointestinal: soft, tender, non-distended, normal bowel sounds





- Integumentary


Integumentary: Present: clear, warm, dry





- Psychiatric


Psychiatric: appropriate mood/affect, intact judgment & insight, memory intact, 

cooperative





- Neurologic


Neurologic: CNII-XII intact





- Allied Health


Allied health notes reviewed: nursing





Results





- Labs


CBC & Chem 7: 


 09/20/18 05:13





 09/20/18 05:13


Labs: 


 Laboratory Last Values











WBC  13.3 K/mm3 (4.5-11.0)  H  09/20/18  05:13    


 


RBC  4.16 M/mm3 (3.65-5.03)   09/20/18  05:13    


 


Hgb  12.7 gm/dl (11.8-15.2)   09/20/18  05:13    


 


Hct  37.7 % (35.5-45.6)  D 09/20/18  05:13    


 


MCV  91 fl (84-94)   09/20/18  05:13    


 


MCH  31 pg (28-32)   09/20/18  05:13    


 


MCHC  34 % (32-34)   09/20/18  05:13    


 


RDW  14.2 % (13.2-15.2)   09/20/18  05:13    


 


Plt Count  296 K/mm3 (140-440)   09/20/18  05:13    


 


Lymph % (Auto)  3.3 % (13.4-35.0)  L  09/20/18  05:13    


 


Mono % (Auto)  7.6 % (0.0-7.3)  H  09/20/18  05:13    


 


Eos % (Auto)  0.0 % (0.0-4.3)   09/20/18  05:13    


 


Baso % (Auto)  0.2 % (0.0-1.8)   09/20/18  05:13    


 


Lymph #  0.4 K/mm3 (1.2-5.4)  L  09/20/18  05:13    


 


Mono #  1.0 K/mm3 (0.0-0.8)  H  09/20/18  05:13    


 


Eos #  0.0 K/mm3 (0.0-0.4)   09/20/18  05:13    


 


Baso #  0.0 K/mm3 (0.0-0.1)   09/20/18  05:13    


 


Seg Neutrophils %  88.9 % (40.0-70.0)  H  09/20/18  05:13    


 


Seg Neutrophils #  11.8 K/mm3 (1.8-7.7)  H  09/20/18  05:13    


 


Sodium  141 mmol/L (137-145)   09/20/18  05:13    


 


Potassium  4.1 mmol/L (3.6-5.0)   09/20/18  05:13    


 


Chloride  101.6 mmol/L ()   09/20/18  05:13    


 


Carbon Dioxide  26 mmol/L (22-30)   09/20/18  05:13    


 


Anion Gap  18 mmol/L  09/20/18  05:13    


 


BUN  50 mg/dL (9-20)  H  09/20/18  05:13    


 


Creatinine  1.4 mg/dL (0.8-1.5)   09/20/18  05:13    


 


Estimated GFR  55 ml/min  09/20/18  05:13    


 


BUN/Creatinine Ratio  36 %  09/20/18  05:13    


 


Glucose  109 mg/dL ()  H  09/20/18  05:13    


 


Lactic Acid  1.80 mmol/L (0.7-2.0)   09/19/18  14:38    


 


Calcium  8.3 mg/dL (8.4-10.2)  L  09/20/18  05:13    


 


Total Bilirubin  0.90 mg/dL (0.1-1.2)   09/19/18  13:15    


 


AST  16 units/L (5-40)   09/19/18  13:15    


 


ALT  13 units/L (7-56)   09/19/18  13:15    


 


Alkaline Phosphatase  56 units/L ()   09/19/18  13:15    


 


Total Protein  8.2 g/dL (6.3-8.2)   09/19/18  13:15    


 


Albumin  3.4 g/dL (3.9-5)  L  09/19/18  13:15    


 


Albumin/Globulin Ratio  0.7 %  09/19/18  13:15    


 


Urine Color  Yellow  (Yellow)   09/19/18  13:49    


 


Urine Turbidity  Cloudy  (Clear)   09/19/18  13:49    


 


Urine pH  5.0  (5.0-7.0)   09/19/18  13:49    


 


Ur Specific Gravity  1.020  (1.003-1.030)   09/19/18  13:49    


 


Urine Protein  100 mg/dl mg/dL (Negative)   09/19/18  13:49    


 


Urine Glucose (UA)  Neg mg/dL (Negative)   09/19/18  13:49    


 


Urine Ketones  Tr mg/dL (Negative)   09/19/18  13:49    


 


Urine Blood  Mod  (Negative)   09/19/18  13:49    


 


Urine Nitrite  Neg  (Negative)   09/19/18  13:49    


 


Urine Bilirubin  Neg  (Negative)   09/19/18  13:49    


 


Urine Urobilinogen  < 2.0 mg/dL (<2.0)   09/19/18  13:49    


 


Ur Leukocyte Esterase  Neg  (Negative)   09/19/18  13:49    


 


Urine WBC (Auto)  5.0 /HPF (0.0-6.0)   09/19/18  13:49    


 


Urine RBC (Auto)  2.0 /HPF (0.0-6.0)   09/19/18  13:49    


 


U Epithel Cells (Auto)  1.0 /HPF (0-13.0)   09/19/18  13:49    


 


Urine Mucus  Few /HPF  09/19/18  13:49    














- Imaging and Cardiology


CT scan - abdomen: pending


CT scan - pelvis: pending

## 2018-09-21 LAB
BUN SERPL-MCNC: 24 MG/DL (ref 9–20)
BUN/CREAT SERPL: 27 %
CALCIUM SERPL-MCNC: 8.3 MG/DL (ref 8.4–10.2)
HCT VFR BLD CALC: 34.9 % (ref 35.5–45.6)
HEMOLYSIS INDEX: 5
HGB BLD-MCNC: 11.6 GM/DL (ref 11.8–15.2)
MCH RBC QN AUTO: 31 PG (ref 28–32)
MCHC RBC AUTO-ENTMCNC: 33 % (ref 32–34)
MCV RBC AUTO: 92 FL (ref 84–94)
PLATELET # BLD: 262 K/MM3 (ref 140–440)
RBC # BLD AUTO: 3.8 M/MM3 (ref 3.65–5.03)

## 2018-09-21 PROCEDURE — 0W9J30Z DRAINAGE OF PELVIC CAVITY WITH DRAINAGE DEVICE, PERCUTANEOUS APPROACH: ICD-10-PCS | Performed by: RADIOLOGY

## 2018-09-21 RX ADMIN — METRONIDAZOLE SCH MLS/HR: 5 INJECTION, SOLUTION INTRAVENOUS at 22:28

## 2018-09-21 RX ADMIN — METRONIDAZOLE SCH MLS/HR: 5 INJECTION, SOLUTION INTRAVENOUS at 05:56

## 2018-09-21 RX ADMIN — LEVOFLOXACIN SCH MLS/HR: 500 INJECTION, SOLUTION INTRAVENOUS at 09:12

## 2018-09-21 RX ADMIN — Medication SCH ML: at 09:13

## 2018-09-21 RX ADMIN — DEXTROSE AND SODIUM CHLORIDE SCH MLS/HR: 5; .9 INJECTION, SOLUTION INTRAVENOUS at 05:56

## 2018-09-21 RX ADMIN — METRONIDAZOLE SCH MLS/HR: 5 INJECTION, SOLUTION INTRAVENOUS at 15:02

## 2018-09-21 RX ADMIN — DEXTROSE AND SODIUM CHLORIDE SCH MLS/HR: 5; .9 INJECTION, SOLUTION INTRAVENOUS at 22:31

## 2018-09-21 NOTE — CONSULTATION
History of Present Illness





- Reason for Consult


Consult date: 09/21/18





- History of Present Illness





Patient with a several day history of abdominal pain noted to have a 

diverticular abscess.  Continues to complain of abdominal pain and nausea.





Past History


Past Medical History: No medical history, other (reviewed)


Past Surgical History: No surgical history, Other (reviewed)


Social history: single.  denies: smoking, alcohol abuse, prescription drug abuse


Family history: no significant family history (reviewed)





Medications and Allergies


 Allergies











Allergy/AdvReac Type Severity Reaction Status Date / Time


 


No Known Allergies Allergy   Unverified 07/14/17 11:50











 Home Medications











 Medication  Instructions  Recorded  Confirmed  Last Taken  Type


 


No Known Home Medications [No  09/19/18 09/19/18 Unknown History





Reported Home Medications]     











Active Meds: 


Active Medications





Acetaminophen (Tylenol)  650 mg PO Q4H PRN


   PRN Reason: Pain MILD(1-3)/Fever >100.5/HA


Albuterol (Proventil)  2.5 mg IH Q4HRT PRN


   PRN Reason: Shortness Of Breath


Metronidazole (Flagyl 500 Mg/100 Ml)  500 mg in 100 mls @ 100 mls/hr IV Q8HR DAGO

; Protocol


   Last Admin: 09/21/18 05:56 Dose:  100 mls/hr


Levofloxacin/Dextrose (Levaquin 500mg/100ml)  500 mg in 100 mls @ 100 mls/hr IV 

Q24HR DAGO; Protocol


   Last Admin: 09/21/18 09:12 Dose:  100 mls/hr


Dextrose/Sodium Chloride (D5ns)  1,000 mls @ 125 mls/hr IV AS DIRECT DAGO


   Last Admin: 09/21/18 05:56 Dose:  125 mls/hr


Morphine Sulfate (Morphine)  2 mg IV Q4H PRN


   PRN Reason: Pain, Moderate (4-6)


Ondansetron HCl (Zofran)  4 mg IV Q8H PRN


   PRN Reason: Nausea And Vomiting


Sodium Chloride (Sodium Chloride Flush Syringe 10 Ml)  10 ml IV BID [facility]


   Last Admin: 09/21/18 09:13 Dose:  10 ml


Sodium Chloride (Sodium Chloride Flush Syringe 10 Ml)  10 ml IV PRN PRN


   PRN Reason: LINE FLUSH











Review of Systems


All systems: negative





Exam





- Constitutional


Vitals: 


 











Temp Pulse Resp BP Pulse Ox


 


 98.5 F   88   18   140/91   96 


 


 09/21/18 04:47  09/21/18 04:47  09/21/18 04:47  09/21/18 04:47  09/21/18 04:47











General appearance: Present: no acute distress





- EENT


Eyes: Present: PERRL


ENT: hearing intact





- Neck


Neck: Present: supple, normal ROM





- Respiratory


Respiratory effort: normal





- Extremities


Extremities: no ischemia





- Abdominal


General gastrointestinal: Present: soft





- Rectal


Rectal Exam: deferred





- Integumentary


Integumentary: Present: clear





- Psychiatric


Psychiatric: appropriate mood/affect, cooperative





- Neurologic


Neurologic: no focal deficits





Results





- Labs


CBC & Chem 7: 


 09/21/18 05:56





 09/21/18 05:56


Labs: 


 Abnormal lab results











  09/21/18 09/21/18 Range/Units





  05:56 05:56 


 


WBC  14.4 H   (4.5-11.0)  K/mm3


 


Hgb  11.6 L   (11.8-15.2)  gm/dl


 


Hct  34.9 L   (35.5-45.6)  %


 


Chloride   107.8 H  ()  mmol/L


 


BUN   24 H  (9-20)  mg/dL


 


Glucose   118 H  ()  mg/dL


 


Calcium   8.3 L  (8.4-10.2)  mg/dL














- Imaging and Cardiology


CT scan - abdomen: image reviewed


CT scan - pelvis: image reviewed





Assessment and Plan





We'll place drain and patient's diverticular abscess today.

## 2018-09-21 NOTE — PROGRESS NOTE
Assessment and Plan





44 yo M with 





1. pneumoperitoneum, secondary to complicated sigmoid diverticulitis with 

contained perforation


2. ALONSO likely secondary to dehydration


3. pelvic abscess 





Plan:





WBC increased. CT scan from today reviewed - large pelvic collection, highly 

suspicious for abscess. Sigmoid diverticulitis. No oral or rectal contrast 

extravasation. Ileus. Official radiology read pending





1. trial of sips of clear liquids


2. IVF - continue with maintenance fluids


3. prn pain and nausea control


4. DVT ppx


5. activity as tolerated


6. CBC in am


7. strict I/Os


8. Discussed CT results with Dr. Díaz and IR consult ordered - pt is now s/p 

drainage of pelvic diverticular abscess


8. home care consult, discussed with case management


9. IV abx





Further recs pending Ct scan in AM.








Subjective


Date of service: 09/21/18


Narrative: 





Pt seen and examined. Had one episode of emesis this afternoon after returning 

from IR. States he feels much better. Denies abdominal pain. Asking for 

liquids. No f/c.





Objective


 Vital Signs - 12hr











  09/21/18 09/21/18 09/21/18





  04:47 13:33 13:41


 


Temperature 98.5 F  


 


Pulse Rate 88  


 


Pulse Rate [  85 83





Intra-Procedure   





]   


 


Pulse Rate [   





Post-Procedure]   


 


Respiratory 18  





Rate   


 


Respiratory  26 H 22





Rate [Intra-   





Procedure]   


 


Respiratory   





Rate [Post-   





Procedure]   


 


Blood Pressure 140/91  


 


Blood Pressure  164/107 159/106





[Intra-   





Procedure]   


 


Blood Pressure   





[Post-Procedure   





]   


 


O2 Sat by Pulse 96  





Oximetry   


 


O2 Sat by Pulse  99 99





Oximetry [   





Intra-Procedure   





]   


 


O2 Sat by Pulse   





Oximetry [Post   





-Procedure]   














  09/21/18 09/21/18 09/21/18





  13:45 13:50 14:01


 


Temperature   


 


Pulse Rate   


 


Pulse Rate [ 89 88 





Intra-Procedure   





]   


 


Pulse Rate [   103 H





Post-Procedure]   


 


Respiratory   





Rate   


 


Respiratory 21 23 





Rate [Intra-   





Procedure]   


 


Respiratory   22





Rate [Post-   





Procedure]   


 


Blood Pressure   


 


Blood Pressure 152/111 156/109 





[Intra-   





Procedure]   


 


Blood Pressure   167/111





[Post-Procedure   





]   


 


O2 Sat by Pulse   





Oximetry   


 


O2 Sat by Pulse 99 99 





Oximetry [   





Intra-Procedure   





]   


 


O2 Sat by Pulse   99





Oximetry [Post   





-Procedure]   














- Labs





 09/21/18 05:56





 09/21/18 05:56


 Diabetes panel











  09/21/18 Range/Units





  05:56 


 


Sodium  144  (137-145)  mmol/L


 


Potassium  3.7  (3.6-5.0)  mmol/L


 


Chloride  107.8 H  ()  mmol/L


 


Carbon Dioxide  27  (22-30)  mmol/L


 


BUN  24 H  (9-20)  mg/dL


 


Creatinine  0.9  (0.8-1.5)  mg/dL


 


Glucose  118 H  ()  mg/dL


 


Calcium  8.3 L  (8.4-10.2)  mg/dL








 Calcium panel











  09/21/18 Range/Units





  05:56 


 


Calcium  8.3 L  (8.4-10.2)  mg/dL








 Pituitary panel











  09/21/18 Range/Units





  05:56 


 


Sodium  144  (137-145)  mmol/L


 


Potassium  3.7  (3.6-5.0)  mmol/L


 


Chloride  107.8 H  ()  mmol/L


 


Carbon Dioxide  27  (22-30)  mmol/L


 


BUN  24 H  (9-20)  mg/dL


 


Creatinine  0.9  (0.8-1.5)  mg/dL


 


Glucose  118 H  ()  mg/dL


 


Calcium  8.3 L  (8.4-10.2)  mg/dL








 Adrenal panel











  09/21/18 Range/Units





  05:56 


 


Sodium  144  (137-145)  mmol/L


 


Potassium  3.7  (3.6-5.0)  mmol/L


 


Chloride  107.8 H  ()  mmol/L


 


Carbon Dioxide  27  (22-30)  mmol/L


 


BUN  24 H  (9-20)  mg/dL


 


Creatinine  0.9  (0.8-1.5)  mg/dL


 


Glucose  118 H  ()  mg/dL


 


Calcium  8.3 L  (8.4-10.2)  mg/dL

## 2018-09-21 NOTE — CAT SCAN REPORT
FINAL REPORT



PROCEDURE:  CT ABDOMEN PELVIS W CON



TECHNIQUE:  Computerized axial tomography of the abdomen and

pelvis was performed after the IV injection of iodinated nonionic

contrast. 



HISTORY:  ?abscess, perf diverticula 



COMPARISON:  No prior studies are available for comparison.



FINDINGS:  

Lower Lung fields: No focal abnormality seen.



Upper Abdomen: Small hiatal hernia visualized. There appears to

be mild gastroesophageal reflux. Gallbladder is partially

contracted otherwise unremarkable. The liver, the adrenal glands,

the pancreas and spleen are unremarkable.



Kidneys, Ureters and Urinary bladder: 1.2 centimeter low-density

nodule seen anterior aspect middle 3rd right kidney which appears

represent renal cortical cyst. Kidneys ureters and urinary

bladder otherwise are unremarkable.



Retroperitoneum: Mild atherosclerotic changes are seen in the

abdominal aorta.  No aneurysm is visualized.



Nonspecific subcentimeter lymph nodes are seen in the

retroperitoneum. No pathologically enlarged lymph nodes are

identified. 



Bowel: There is an irregular-shaped fluid collection in the lower

pelvis posterior to the urinary bladder seen best on image 106

series 2 axial image measuring 4.8 x 7.3 centimeter. Few small

bubbles of gas are visualized. An abscess is suspected. The walls

of the sigmoid colon show moderate diffuse wall thickening. There

is mild diverticulosis. The colon is otherwise unremarkable.

Normal-appearing appendix is seen in the right lower quadrant.

There are multiple loops of small bowel moderately fluid in gas

distended in the mid abdomen. There appears to be a transition in

the right lower quadrant. A least partial small bowel obstruction

suspected. No free air is visualized. No ascites is seen. No free

intraperitoneal gas is identified. 



Reproductive organs: Prostate gland does not appear to be

significantly enlarged.



Other: No acute bony abnormalities are seen.



IMPRESSION:  

Slightly lobulated fluid collection with a few bubbles of gas

visualized posterior to the urinary bladder in the lower pelvis.

An abscess is suspected. No free air is seen. There is

diverticulosis seen in the sigmoid colon and this could represent

a diverticular abscess. Wall thickening is seen in the sigmoid

colon. I suspect this is related to diverticulitis and abscess

formation. Malignancy in the sigmoid colon not entirely excluded

although probably less likely.



A least partial small bowel obstruction suspected with transition

in the right lower quadrant. 



Small renal cortical cyst visualized as described. 



Small hiatal hernia visualized. There appears to be mild

gastroesophageal reflux.

## 2018-09-21 NOTE — CAT SCAN REPORT
Exam: CT-guided placement of abscess drain



Clinical indication: Patient with history of diverticular abscess



Date: 9/21/2018



Procedure: Following explanation of risks, benefits and alternative; 

written consent was obtained. Patient was brought to the CT suite and 

placed in prone position on the examination table. Initial  images 

of the lower abdomen and pelvis were performed an appropriate access 

site was chosen along the right gluteus. The patient's lower back and 

pelvis were prepped and draped in the usual sterile fashion. 1% 

lidocaine was used for anesthesia.



Using intermittent CT guidance, a 15 cm 17-gauge trocar needle was 

advanced into the fluid collection in the dependent portion of the 

pelvis. There was prompt return of purulent fluid. The trocar was 

removed and a 0.035 guidewire advanced and coiled within the fluid 

collection. CT images were saved to document appropriate positioning. 

Following serial dilation over the guidewire, an 8 [place] pigtail 

drainage catheter was placed over the guidewire and positioned with the 

pigtail in the central aspect of the fluid collection. A total of 50 

mL's seropurulent fluid was aspirated. Sample sent for laboratory 

analysis. The catheter was actually passed and the skin surface using 

2-0 Ethilon suture and a Stayfix device and placed to CARLO bulb drainage. 

A sterile dressing was then applied.



The patient tolerated the procedure well there were no immediate post 

procedure complications.



Conscious sedation was performed of the radiologic nursing. Continuous 

cardiopulmonary monitoring was utilized.



Impression: 1) CT-guided placement of 8 [place] drainage catheter in 

diverticular abscess within the dependent portion of the pelvis. 

Samples were sent for laboratory analysis.

## 2018-09-21 NOTE — PROGRESS NOTE
Assessment and Plan


Assessment and plan: 


44 YO Male with No PMH presents to ED for evaluation. Pt states that he has 

experienced abdominal pain over the past 3 days with persistent symptoms over 

the same time frame. Pt states that pain is 6/10, crampy in nature, associated 

with nausea, and 3-4 episodes of vomiting, and one loose stools. Pt denies fever

, chills, CP, Palpitations, Syncope, Trauma, BRBPR, unintentional weight loss, 

night sweats, BRBPR, hematuria, flank pain, or recent ill contacts. Pt seen and 

evaluated in ED and found to have SIRS, ARF, Diverticulosis with Perforated 

Viscus. 








Peritonitis


Penuomperitoneum with possible perforated diverticulitis


ALONSO secondary to vasomotor nephropathy-improving


Sepsis secondary to above


Possible Pelvic abscess





Plan


Continue supportive care


Mild elevation in leukocytosis- IR consulted for possible drainage


Repeat CT abd and pelvis in am per discussion with surgery


IV abx


Pain control


IVF- D5NS


Encourage ambulation


DVT/GI PROPHY














History


Interval history: 


Patient seen and examined No new fever today, resting comfortable








Hospitalist Physical





- Physical exam


Narrative exam: 


General appearance: Present: mild distress





- EENT


Eyes: Present: PERRL


ENT: hearing intact, clear oral mucosa, dentition normal





- Neck


Neck: Present: supple, normal ROM





- Respiratory


Respiratory effort: normal


Respiratory: bilateral: CTA





- Cardiovascular


Rhythm: regular


Heart Sounds: Present: S1 & S2.  Absent: systolic murmur





- Extremities


Extremities: no ischemia, pulses intact, pulses symmetrical, No edema, normal 

temperature, normal color, Full ROM


Peripheral Pulses: within normal limits





- Abdominal


General gastrointestinal: soft, tender, non-distended, normal bowel sounds





- Integumentary


Integumentary: Present: clear, warm, dry





- Psychiatric


Psychiatric: appropriate mood/affect, intact judgment & insight, memory intact, 

cooperative





- Neurologic


Neurologic: CNII-XII intact





- Allied Health


Allied health notes reviewed: nursing








- Constitutional


Vitals: 


 











Temp Pulse Resp BP Pulse Ox


 


 98.5 F   103 H  22   167/111   99 


 


 09/21/18 04:47  09/21/18 14:01  09/21/18 14:01  09/21/18 14:01  09/21/18 14:01











General appearance: Present: no acute distress





Results





- Labs


CBC & Chem 7: 


 09/21/18 05:56





 09/21/18 05:56


Labs: 


 Laboratory Last Values











WBC  14.4 K/mm3 (4.5-11.0)  H  09/21/18  05:56    


 


RBC  3.80 M/mm3 (3.65-5.03)   09/21/18  05:56    


 


Hgb  11.6 gm/dl (11.8-15.2)  L  09/21/18  05:56    


 


Hct  34.9 % (35.5-45.6)  L  09/21/18  05:56    


 


MCV  92 fl (84-94)   09/21/18  05:56    


 


MCH  31 pg (28-32)   09/21/18  05:56    


 


MCHC  33 % (32-34)   09/21/18  05:56    


 


RDW  14.4 % (13.2-15.2)   09/21/18  05:56    


 


Plt Count  262 K/mm3 (140-440)   09/21/18  05:56    


 


Lymph % (Auto)  3.3 % (13.4-35.0)  L  09/20/18  05:13    


 


Mono % (Auto)  7.6 % (0.0-7.3)  H  09/20/18  05:13    


 


Eos % (Auto)  0.0 % (0.0-4.3)   09/20/18  05:13    


 


Baso % (Auto)  0.2 % (0.0-1.8)   09/20/18  05:13    


 


Lymph #  0.4 K/mm3 (1.2-5.4)  L  09/20/18  05:13    


 


Mono #  1.0 K/mm3 (0.0-0.8)  H  09/20/18  05:13    


 


Eos #  0.0 K/mm3 (0.0-0.4)   09/20/18  05:13    


 


Baso #  0.0 K/mm3 (0.0-0.1)   09/20/18  05:13    


 


Seg Neutrophils %  88.9 % (40.0-70.0)  H  09/20/18  05:13    


 


Seg Neutrophils #  11.8 K/mm3 (1.8-7.7)  H  09/20/18  05:13    


 


Sodium  144 mmol/L (137-145)   09/21/18  05:56    


 


Potassium  3.7 mmol/L (3.6-5.0)   09/21/18  05:56    


 


Chloride  107.8 mmol/L ()  H  09/21/18  05:56    


 


Carbon Dioxide  27 mmol/L (22-30)   09/21/18  05:56    


 


Anion Gap  13 mmol/L  09/21/18  05:56    


 


BUN  24 mg/dL (9-20)  H  09/21/18  05:56    


 


Creatinine  0.9 mg/dL (0.8-1.5)   09/21/18  05:56    


 


Estimated GFR  > 60 ml/min  09/21/18  05:56    


 


BUN/Creatinine Ratio  27 %  09/21/18  05:56    


 


Glucose  118 mg/dL ()  H  09/21/18  05:56    


 


Lactic Acid  1.80 mmol/L (0.7-2.0)   09/19/18  14:38    


 


Calcium  8.3 mg/dL (8.4-10.2)  L  09/21/18  05:56    


 


Total Bilirubin  0.90 mg/dL (0.1-1.2)   09/19/18  13:15    


 


AST  16 units/L (5-40)   09/19/18  13:15    


 


ALT  13 units/L (7-56)   09/19/18  13:15    


 


Alkaline Phosphatase  56 units/L ()   09/19/18  13:15    


 


Total Protein  8.2 g/dL (6.3-8.2)   09/19/18  13:15    


 


Albumin  3.4 g/dL (3.9-5)  L  09/19/18  13:15    


 


Albumin/Globulin Ratio  0.7 %  09/19/18  13:15    


 


Urine Color  Yellow  (Yellow)   09/19/18  13:49    


 


Urine Turbidity  Cloudy  (Clear)   09/19/18  13:49    


 


Urine pH  5.0  (5.0-7.0)   09/19/18  13:49    


 


Ur Specific Gravity  1.020  (1.003-1.030)   09/19/18  13:49    


 


Urine Protein  100 mg/dl mg/dL (Negative)   09/19/18  13:49    


 


Urine Glucose (UA)  Neg mg/dL (Negative)   09/19/18  13:49    


 


Urine Ketones  Tr mg/dL (Negative)   09/19/18  13:49    


 


Urine Blood  Mod  (Negative)   09/19/18  13:49    


 


Urine Nitrite  Neg  (Negative)   09/19/18  13:49    


 


Urine Bilirubin  Neg  (Negative)   09/19/18  13:49    


 


Urine Urobilinogen  < 2.0 mg/dL (<2.0)   09/19/18  13:49    


 


Ur Leukocyte Esterase  Neg  (Negative)   09/19/18  13:49    


 


Urine WBC (Auto)  5.0 /HPF (0.0-6.0)   09/19/18  13:49    


 


Urine RBC (Auto)  2.0 /HPF (0.0-6.0)   09/19/18  13:49    


 


U Epithel Cells (Auto)  1.0 /HPF (0-13.0)   09/19/18  13:49    


 


Urine Mucus  Few /HPF  09/19/18  13:49    














- Imaging and Cardiology


CT scan - abdomen: pending


CT Scan - head: pending

## 2018-09-22 RX ADMIN — METRONIDAZOLE SCH MLS/HR: 5 INJECTION, SOLUTION INTRAVENOUS at 13:08

## 2018-09-22 RX ADMIN — DEXTROSE AND SODIUM CHLORIDE SCH MLS/HR: 5; .9 INJECTION, SOLUTION INTRAVENOUS at 13:15

## 2018-09-22 RX ADMIN — PANTOPRAZOLE SODIUM SCH MG: 40 INJECTION, POWDER, FOR SOLUTION INTRAVENOUS at 10:33

## 2018-09-22 RX ADMIN — Medication SCH: at 19:33

## 2018-09-22 RX ADMIN — METRONIDAZOLE SCH MLS/HR: 5 INJECTION, SOLUTION INTRAVENOUS at 21:13

## 2018-09-22 RX ADMIN — Medication SCH ML: at 14:42

## 2018-09-22 RX ADMIN — DEXTROSE AND SODIUM CHLORIDE SCH MLS/HR: 5; .9 INJECTION, SOLUTION INTRAVENOUS at 23:29

## 2018-09-22 RX ADMIN — METRONIDAZOLE SCH MLS/HR: 5 INJECTION, SOLUTION INTRAVENOUS at 14:41

## 2018-09-22 RX ADMIN — LEVOFLOXACIN SCH MLS/HR: 500 INJECTION, SOLUTION INTRAVENOUS at 13:09

## 2018-09-22 RX ADMIN — Medication SCH ML: at 21:13

## 2018-09-22 RX ADMIN — CALCIUM CARBONATE (ANTACID) CHEW TAB 500 MG SCH MG: 500 CHEW TAB at 21:12

## 2018-09-22 RX ADMIN — CALCIUM CARBONATE (ANTACID) CHEW TAB 500 MG SCH MG: 500 CHEW TAB at 10:34

## 2018-09-22 NOTE — PROGRESS NOTE
Assessment and Plan





44 yo M with 





1. pneumoperitoneum, secondary to complicated sigmoid diverticulitis with 

contained perforation


2. ALONSO likely secondary to dehydration - improving


3. pelvic abscess s/p IR Drain





Plan:





1. continue with clear liquids, will adv slowly


2. IVF - continue with maintenance fluids


3. prn pain and nausea control


4. DVT ppx


5. activity as tolerated


6. CBC ordered for am tomorrow


7. strict I/Os


8. home care consult, discussed with case management


9. IV abx





Anticipate discharge in the next 48 hours if WBC improves and patient is 

clinically progressing with diet advancement.





Thank you, please call with questions or concerns.





Subjective


Date of service: 09/22/18


Narrative: 





Pt seen and examined. c/o hiccups and intermittent reflux which improved after 

taking Tums. No f/c, abdominal pain, n/v. He tolerated clear liquids. He has 

had bowel movements and is passing flatus. He is passing urine easily without 

pain.





Objective


 Vital Signs - 12hr











  09/22/18 09/22/18





  05:21 11:35


 


Temperature 98.9 F 98.8 F


 


Pulse Rate 80 76


 


Respiratory 20 19





Rate  


 


Blood Pressure 146/97 159/97


 


O2 Sat by Pulse 98 99





Oximetry  














- General physical appearance


Narrative Exam: 





Gen: AAOx3. NAD


CV: S1, S2+


Resp: even and unlabored


Abd: soft, NT, mildly distended. Transgluteal pelvic drain with purulent fluid 

- approximately 25cc in bulb


Ext: no c/c/e





- Labs





 09/21/18 05:56





 09/21/18 05:56

## 2018-09-23 LAB
HCT VFR BLD CALC: 32.4 % (ref 35.5–45.6)
HGB BLD-MCNC: 10.7 GM/DL (ref 11.8–15.2)
MCH RBC QN AUTO: 30 PG (ref 28–32)
MCHC RBC AUTO-ENTMCNC: 33 % (ref 32–34)
MCV RBC AUTO: 91 FL (ref 84–94)
PLATELET # BLD: 257 K/MM3 (ref 140–440)
RBC # BLD AUTO: 3.58 M/MM3 (ref 3.65–5.03)

## 2018-09-23 RX ADMIN — PANTOPRAZOLE SODIUM SCH MG: 40 INJECTION, POWDER, FOR SOLUTION INTRAVENOUS at 09:36

## 2018-09-23 RX ADMIN — DEXTROSE AND SODIUM CHLORIDE SCH MLS/HR: 5; .9 INJECTION, SOLUTION INTRAVENOUS at 09:36

## 2018-09-23 RX ADMIN — Medication SCH ML: at 16:04

## 2018-09-23 RX ADMIN — Medication SCH ML: at 21:11

## 2018-09-23 RX ADMIN — METRONIDAZOLE SCH MLS/HR: 5 INJECTION, SOLUTION INTRAVENOUS at 16:04

## 2018-09-23 RX ADMIN — LEVOFLOXACIN SCH MLS/HR: 500 INJECTION, SOLUTION INTRAVENOUS at 09:35

## 2018-09-23 RX ADMIN — METRONIDAZOLE SCH MLS/HR: 5 INJECTION, SOLUTION INTRAVENOUS at 23:48

## 2018-09-23 RX ADMIN — CALCIUM CARBONATE (ANTACID) CHEW TAB 500 MG SCH MG: 500 CHEW TAB at 21:10

## 2018-09-23 RX ADMIN — METRONIDAZOLE SCH MLS/HR: 5 INJECTION, SOLUTION INTRAVENOUS at 06:31

## 2018-09-23 RX ADMIN — CALCIUM CARBONATE (ANTACID) CHEW TAB 500 MG SCH MG: 500 CHEW TAB at 09:35

## 2018-09-23 NOTE — PROGRESS NOTE
Assessment and Plan


Assessment and plan: 


42 YO Male with No PMH presents to ED for evaluation. Pt states that he has 

experienced abdominal pain over the past 3 days with persistent symptoms over 

the same time frame. Pt states that pain is 6/10, crampy in nature, associated 

with nausea, and 3-4 episodes of vomiting, and one loose stools. Pt denies fever

, chills, CP, Palpitations, Syncope, Trauma, BRBPR, unintentional weight loss, 

night sweats, BRBPR, hematuria, flank pain, or recent ill contacts. Pt seen and 

evaluated in ED and found to have SIRS, ARF, Diverticulosis with Perforated 

Viscus. 








Peritonitis


Penuomperitoneum with possible perforated diverticulitis


ALONSO secondary to vasomotor nephropathy-improving


Sepsis secondary to above


Possible Pelvic abscess





Plan


Continue supportive care


Mild elevation in leukocytosis


ADVANCE DIET


Fluids discontinued


Home in am with home health


IR PLACED Drain


Thorazine for hippcups


IV abx


Pain control


Encourage ambulation


DVT/GI PROPHY











History


Interval history: 


Patient seen and examined No new fever today, resting comfortable. No further 

hiccups. Tolerating diet








Hospitalist Physical





- Physical exam


Narrative exam: 


General appearance: Present: mild distress





- EENT


Eyes: Present: PERRL


ENT: hearing intact, clear oral mucosa, dentition normal





- Neck


Neck: Present: supple, normal ROM





- Respiratory


Respiratory effort: normal


Respiratory: bilateral: CTA





- Cardiovascular


Rhythm: regular


Heart Sounds: Present: S1 & S2.  Absent: systolic murmur





- Extremities


Extremities: no ischemia, pulses intact, pulses symmetrical, No edema, normal 

temperature, normal color, Full ROM


Peripheral Pulses: within normal limits





- Abdominal


General gastrointestinal: soft, tender, non-distended, normal bowel sounds. CARLO 

drain in place





- Integumentary


Integumentary: Present: clear, warm, dry





- Psychiatric


Psychiatric: appropriate mood/affect, intact judgment & insight, memory intact, 

cooperative





- Neurologic


Neurologic: CNII-XII intact





- Allied Health


Allied health notes reviewed: nursing








- Constitutional


Vitals: 


 











Temp Pulse Resp BP Pulse Ox


 


 98.2 F   81   18   156/104   98 


 


 09/23/18 05:41  09/23/18 05:41  09/23/18 05:41  09/23/18 05:41  09/23/18 05:41











General appearance: Present: no acute distress





Results





- Labs


CBC & Chem 7: 


 09/23/18 07:10





 09/21/18 05:56


Labs: 


 Laboratory Last Values











WBC  13.9 K/mm3 (4.5-11.0)  H  09/23/18  07:10    


 


RBC  3.58 M/mm3 (3.65-5.03)  L  09/23/18  07:10    


 


Hgb  10.7 gm/dl (11.8-15.2)  L  09/23/18  07:10    


 


Hct  32.4 % (35.5-45.6)  L  09/23/18  07:10    


 


MCV  91 fl (84-94)   09/23/18  07:10    


 


MCH  30 pg (28-32)   09/23/18  07:10    


 


MCHC  33 % (32-34)   09/23/18  07:10    


 


RDW  14.6 % (13.2-15.2)   09/23/18  07:10    


 


Plt Count  257 K/mm3 (140-440)   09/23/18  07:10    


 


Lymph % (Auto)  3.3 % (13.4-35.0)  L  09/20/18  05:13    


 


Mono % (Auto)  7.6 % (0.0-7.3)  H  09/20/18  05:13    


 


Eos % (Auto)  0.0 % (0.0-4.3)   09/20/18  05:13    


 


Baso % (Auto)  0.2 % (0.0-1.8)   09/20/18  05:13    


 


Lymph #  0.4 K/mm3 (1.2-5.4)  L  09/20/18  05:13    


 


Mono #  1.0 K/mm3 (0.0-0.8)  H  09/20/18  05:13    


 


Eos #  0.0 K/mm3 (0.0-0.4)   09/20/18  05:13    


 


Baso #  0.0 K/mm3 (0.0-0.1)   09/20/18  05:13    


 


Seg Neutrophils %  88.9 % (40.0-70.0)  H  09/20/18  05:13    


 


Seg Neutrophils #  11.8 K/mm3 (1.8-7.7)  H  09/20/18  05:13    


 


Sodium  144 mmol/L (137-145)   09/21/18  05:56    


 


Potassium  3.7 mmol/L (3.6-5.0)   09/21/18  05:56    


 


Chloride  107.8 mmol/L ()  H  09/21/18  05:56    


 


Carbon Dioxide  27 mmol/L (22-30)   09/21/18  05:56    


 


Anion Gap  13 mmol/L  09/21/18  05:56    


 


BUN  24 mg/dL (9-20)  H  09/21/18  05:56    


 


Creatinine  0.9 mg/dL (0.8-1.5)   09/21/18  05:56    


 


Estimated GFR  > 60 ml/min  09/21/18  05:56    


 


BUN/Creatinine Ratio  27 %  09/21/18  05:56    


 


Glucose  118 mg/dL ()  H  09/21/18  05:56    


 


Lactic Acid  1.80 mmol/L (0.7-2.0)   09/19/18  14:38    


 


Calcium  8.3 mg/dL (8.4-10.2)  L  09/21/18  05:56    


 


Total Bilirubin  0.90 mg/dL (0.1-1.2)   09/19/18  13:15    


 


AST  16 units/L (5-40)   09/19/18  13:15    


 


ALT  13 units/L (7-56)   09/19/18  13:15    


 


Alkaline Phosphatase  56 units/L ()   09/19/18  13:15    


 


Total Protein  8.2 g/dL (6.3-8.2)   09/19/18  13:15    


 


Albumin  3.4 g/dL (3.9-5)  L  09/19/18  13:15    


 


Albumin/Globulin Ratio  0.7 %  09/19/18  13:15    


 


Urine Color  Yellow  (Yellow)   09/19/18  13:49    


 


Urine Turbidity  Cloudy  (Clear)   09/19/18  13:49    


 


Urine pH  5.0  (5.0-7.0)   09/19/18  13:49    


 


Ur Specific Gravity  1.020  (1.003-1.030)   09/19/18  13:49    


 


Urine Protein  100 mg/dl mg/dL (Negative)   09/19/18  13:49    


 


Urine Glucose (UA)  Neg mg/dL (Negative)   09/19/18  13:49    


 


Urine Ketones  Tr mg/dL (Negative)   09/19/18  13:49    


 


Urine Blood  Mod  (Negative)   09/19/18  13:49    


 


Urine Nitrite  Neg  (Negative)   09/19/18  13:49    


 


Urine Bilirubin  Neg  (Negative)   09/19/18  13:49    


 


Urine Urobilinogen  < 2.0 mg/dL (<2.0)   09/19/18  13:49    


 


Ur Leukocyte Esterase  Neg  (Negative)   09/19/18  13:49    


 


Urine WBC (Auto)  5.0 /HPF (0.0-6.0)   09/19/18  13:49    


 


Urine RBC (Auto)  2.0 /HPF (0.0-6.0)   09/19/18  13:49    


 


U Epithel Cells (Auto)  1.0 /HPF (0-13.0)   09/19/18  13:49    


 


Urine Mucus  Few /HPF  09/19/18  13:49

## 2018-09-23 NOTE — PROGRESS NOTE
Assessment and Plan





42 yo M with 





1. pneumoperitoneum, secondary to complicated sigmoid diverticulitis with 

contained perforation


2. ALONSO likely secondary to dehydration - resolved


3. pelvic abscess s/p IR Drain





Plan:





1. adv to full liquids


2. dc IVF


3. Prn PO pain control


4. DVT ppx


5. activity as tolerated, encouraged ambulation


6. CBC in am, WBC trending down


7. strict I/Os, monitor drain output


8. IV abx, will transition to PO on dc


9. pelvic fluid culture prelim - few gram pos cocci in pairs, few gram neg rods


10. home care consult, discussed with case management





Patient's new address is:


[address] [place] [address]








D/W Dr. Giang. May be ready for discharge in am.





Thank you, please call with questions or concerns.








Subjective


Date of service: 09/23/18


Narrative: 





Pt seen and examined. Feeling better. No abdominal pain. Having BMs and flatus. 

+ Hiccups, received thorazine yesterday which made him feel unwell. No f/c, cp, 

sob, n/v. Tolerating clear liquid diet.





Objective


 Vital Signs - 12hr











  09/22/18 09/23/18





  23:59 05:41


 


Temperature 98.4 F 98.2 F


 


Pulse Rate 73 81


 


Respiratory 18 18





Rate  


 


Blood Pressure 143/84 156/104


 


O2 Sat by Pulse 98 98





Oximetry  














- General physical appearance


Narrative Exam: 





Gen: AAOx3. NAD


CV: S1, S2+


Resp: even and unlabored


Abd: soft, mildy distended, NT. no r/r/g. R transgluateal drain with minimal 

serous fluid


Ext: no c/c/e





CARLO: 25cc/24 hours





- Labs





 09/23/18 07:10





 09/21/18 05:56

## 2018-09-24 VITALS — SYSTOLIC BLOOD PRESSURE: 150 MMHG | DIASTOLIC BLOOD PRESSURE: 92 MMHG

## 2018-09-24 LAB
HCT VFR BLD CALC: 33 % (ref 35.5–45.6)
HGB BLD-MCNC: 10.9 GM/DL (ref 11.8–15.2)
MCH RBC QN AUTO: 30 PG (ref 28–32)
MCHC RBC AUTO-ENTMCNC: 33 % (ref 32–34)
MCV RBC AUTO: 90 FL (ref 84–94)
PLATELET # BLD: 327 K/MM3 (ref 140–440)
RBC # BLD AUTO: 3.67 M/MM3 (ref 3.65–5.03)

## 2018-09-24 RX ADMIN — LEVOFLOXACIN SCH MLS/HR: 500 INJECTION, SOLUTION INTRAVENOUS at 09:25

## 2018-09-24 RX ADMIN — METRONIDAZOLE SCH MLS/HR: 5 INJECTION, SOLUTION INTRAVENOUS at 06:24

## 2018-09-24 RX ADMIN — Medication SCH ML: at 09:24

## 2018-09-24 RX ADMIN — CALCIUM CARBONATE (ANTACID) CHEW TAB 500 MG SCH MG: 500 CHEW TAB at 09:24

## 2018-09-24 RX ADMIN — METRONIDAZOLE SCH MLS/HR: 5 INJECTION, SOLUTION INTRAVENOUS at 13:27

## 2018-09-24 RX ADMIN — PANTOPRAZOLE SODIUM SCH MG: 40 INJECTION, POWDER, FOR SOLUTION INTRAVENOUS at 09:24

## 2018-09-24 NOTE — DISCHARGE SUMMARY
<RAUL NEIL - Last Filed: 09/24/18 14:41>





Providers





- Providers


Date of Admission: 


09/19/18 15:26





Attending physician: 


VIVIAN TERRY MD





 





09/19/18 14:54


Consult to Physician [CONS] Urgent 


   Comment: DR NEIL NOTIFIED


   Consulting Provider: RAUL NEIL


   Physician Instructions: 


   Reason For Exam: perforated diverticula





09/21/18 12:20


Consult to Interventional Radiology [CONS] Routine 


   Consulting Provider: MOLLY MULLINS


   Reason For Exam: pevic fluid likely abscess, perf diverticulitis


   Place consult to:: ángela rowley


   Notified:: karan


   Phone number called:: overhead page


   Was contact made?: No


   Time called:: 15:03


   Comment:: md is aware of pt. note is in computer











Primary care physician: 


PRIMARY CARE MD








Hospitalization


Condition: Stable


Disposition: DC/TX-06 HOME UNDER HOME HLTH





Exam





- Constitutional


Vitals: 


 











Temp Pulse Resp BP Pulse Ox


 


 98.4 F   82   16   150/92   98 


 


 09/24/18 12:24  09/24/18 12:24  09/24/18 12:24  09/24/18 12:24  09/24/18 12:24














Plan


Diet: advance as tolerated (advance to soft diet on Thursday. Keep yourself on 

a low fiber diet for the next two weeks, then advance to high fiber diet)


Additional Instructions: Drain Care: empty drain every day and record output.


Follow up with: 


RAUL NEIL DO [Staff Physician] - 7 Days


PRIMARY CARE,MD [Primary Care Provider] - 3-5 Days


RUTH IBRAHIM MD [Staff Physician] - 7 Days


Prescriptions: 


Calcium Carbonate [Tums] 500 mg PO BID #14 tablet


levoFLOXacin [Levaquin] 750 mg PO QDAY #7 tablet


metroNIDAZOLE [Flagyl] 500 mg PO Q8HR #21 tablet


oxyCODONE /ACETAMINOPHEN [Percocet 5/325] 1 tab PO Q6HR PRN #20 tablet


 PRN Reason: Pain


Pantoprazole [Protonix TAB] 40 mg PO DAILY #30 tablet


Sennosides Tab [Senokot] 8.6 mg PO BID #30 tablet





<VIVIAN TERRY - Last Filed: 09/24/18 16:35>





Providers





- Providers


Date of Admission: 


09/19/18 15:26





Attending physician: 


VIVIAN TERRY MD





 





09/19/18 14:54


Consult to Physician [CONS] Urgent 


   Comment: DR NEIL NOTIFIED


   Consulting Provider: RAUL NEIL


   Physician Instructions: 


   Reason For Exam: perforated diverticula





09/21/18 12:20


Consult to Interventional Radiology [CONS] Routine 


   Consulting Provider: MOLLY MULLINS


   Reason For Exam: pevic fluid likely abscess, perf diverticulitis


   Place consult to:: ángela rowley


   Notified:: karan


   Phone number called:: overhead page


   Was contact made?: No


   Time called:: 15:03


   Comment:: md is aware of pt. note is in computer











Primary care physician: 


PRIMARY CARE MD








Hospitalization


Reason for admission: abdominal pain


Hospital course: 


44 YO Male with No PMH presents to ED for evaluation. Pt states that he has 

experienced abdominal pain over the past 3 days with persistent symptoms over 

the same time frame. Pt states that pain is 6/10, crampy in nature, associated 

with nausea, and 3-4 episodes of vomiting, and one loose stools. Pt denies fever

, chills, CP, Palpitations, Syncope, Trauma, BRBPR, unintentional weight loss, 

night sweats, BRBPR, hematuria, flank pain, or recent ill contacts. Pt seen and 

evaluated in ED and found to have SIRS, ARF, Diverticulosis with Perforated 

Viscus.  Surgery and vascular consult with interventional radiology placed a 

drain.  Patient developed some he comes postprocedure this was resolved with 

Thorazine.  Patient was seen by surgery IV fluids was discontinued patient 

continue antibiotics WBC trended down.  Outputs of the drain was monitored 

closely.  Pelvic fluid culture preliminary grew gram-positive cocci and a few 

gram-negative rods.  The patient was discharged on antibiotics with Levaquin 

and Flagyl and to follow-up with surgery outpatient.  Also did discuss 

insurance issues and the patient stated that he will rectify his insurance he's 

new address was updated to the [employer].  He is clinically stable 

for discharge





Patient's new address is:


[address] [place], [place] [id number]








Peritonitis


pneumoperitoneum, secondary to complicated sigmoid diverticulitis with 

contained perforation


ALONSO secondary to vasomotor nephropathy-improving


Sepsis secondary to above


Possible Pelvic abscess








Time spent for discharge: 35 mins





Core Measure Documentation





- Palliative Care


Palliative Care/ Comfort Measures: Not Applicable





- Core Measures


Any of the following diagnoses?: none





- VTE Discharge Requirements


Deep Vein Thrombosis/Pulmonary Embolism Present on Admission: No





Exam





- Physical Exam


Narrative exam: 


General appearance: Present: mild distress





- EENT


Eyes: Present: PERRL


ENT: hearing intact, clear oral mucosa, dentition normal





- Neck


Neck: Present: supple, normal ROM





- Respiratory


Respiratory effort: normal


Respiratory: bilateral: CTA





- Cardiovascular


Rhythm: regular


Heart Sounds: Present: S1 & S2.  Absent: systolic murmur





- Extremities


Extremities: no ischemia, pulses intact, pulses symmetrical, No edema, normal 

temperature, normal color, Full ROM


Peripheral Pulses: within normal limits





- Abdominal


General gastrointestinal: soft, tender, non-distended, normal bowel sounds. CARLO 

drain in place





- Integumentary


Integumentary: Present: clear, warm, dry





- Psychiatric


Psychiatric: appropriate mood/affect, intact judgment & insight, memory intact, 

cooperative





- Neurologic


Neurologic: CNII-XII intact





- Allied Health


Allied health notes reviewed: nursing








- Constitutional


Vitals: 


 











Temp Pulse Resp BP Pulse Ox


 


 98.4 F   82   16   150/92   98 


 


 09/24/18 12:24  09/24/18 12:24  09/24/18 12:24  09/24/18 12:24  09/24/18 12:24














Plan


Activity: advance as tolerated, fall precautions


Diet: advance as tolerated (increase dietry fiber)


Special Instructions: record daily BP diary, record blood sugar diary

## 2018-09-24 NOTE — PROGRESS NOTE
Assessment and Plan





44 yo M with 





1. pneumoperitoneum, secondary to complicated sigmoid diverticulitis with 

contained perforation


2. ALONSO likely secondary to dehydration - resolved


3. pelvic abscess s/p IR Drain





Plan:





1. full liquid diet, adv slowly to gi soft


2. Prn PO pain control


3. DVT ppx


4. activity as tolerated, encouraged ambulation


5. WBC trending down


6. strict I/Os, monitor drain output


7. IV abx, transition to PO on dc


9. pelvic fluid culture prelim - few gram pos cocci in pairs, few gram neg rods


10. home care consult set up





D/W Dr. Giang. ok for dc home. Pt to follow up in office in 1 week.





Thank you, please call with questions or concerns.





Subjective


Date of service: 09/24/18


Narrative: 





Pt seen and examined. No complaints. No more hiccups. + BM and flatus. 

Tolerating diet. No abd pain, n/v, f/c





Objective


 Vital Signs - 12hr











  09/24/18 09/24/18





  05:56 12:24


 


Temperature 98.8 F 98.4 F


 


Pulse Rate 81 82


 


Respiratory 20 16





Rate  


 


Blood Pressure 139/97 150/92


 


O2 Sat by Pulse 95 98





Oximetry  














- General physical appearance


Narrative Exam: 





gen; AAOx3. NAD


CV: S1, S2+


Resp: even and unlabored


Abd: Soft, NT, ND. R transgluteal drain serous


Ext: no c/c/e





- Labs





 09/24/18 05:10





 09/21/18 05:56

## 2022-12-20 NOTE — PROGRESS NOTE
Assessment and Plan


Assessment and plan: 


42 YO Male with No PMH presents to ED for evaluation. Pt states that he has 

experienced abdominal pain over the past 3 days with persistent symptoms over 

the same time frame. Pt states that pain is 6/10, crampy in nature, associated 

with nausea, and 3-4 episodes of vomiting, and one loose stools. Pt denies fever

, chills, CP, Palpitations, Syncope, Trauma, BRBPR, unintentional weight loss, 

night sweats, BRBPR, hematuria, flank pain, or recent ill contacts. Pt seen and 

evaluated in ED and found to have SIRS, ARF, Diverticulosis with Perforated 

Viscus. 








Peritonitis


Penuomperitoneum with possible perforated diverticulitis


ALONSO secondary to vasomotor nephropathy-improving


Sepsis secondary to above


Possible Pelvic abscess





Plan


Continue supportive care


Mild elevation in leukocytosis


IR PLACED Drain


Thorazine for hippcups


Repeat CT abd and pelvis in am per discussion with surgery


IV abx


Pain control


IVF- D5NS


Encourage ambulation


DVT/GI PROPHY














History


Interval history: 


Patient seen and examined No new fever today, resting comfortable. Hicupps today

, spouse at bedside








Hospitalist Physical





- Physical exam


Narrative exam: 


General appearance: Present: mild distress





- EENT


Eyes: Present: PERRL


ENT: hearing intact, clear oral mucosa, dentition normal





- Neck


Neck: Present: supple, normal ROM





- Respiratory


Respiratory effort: normal


Respiratory: bilateral: CTA





- Cardiovascular


Rhythm: regular


Heart Sounds: Present: S1 & S2.  Absent: systolic murmur





- Extremities


Extremities: no ischemia, pulses intact, pulses symmetrical, No edema, normal 

temperature, normal color, Full ROM


Peripheral Pulses: within normal limits





- Abdominal


General gastrointestinal: soft, tender, non-distended, normal bowel sounds





- Integumentary


Integumentary: Present: clear, warm, dry





- Psychiatric


Psychiatric: appropriate mood/affect, intact judgment & insight, memory intact, 

cooperative





- Neurologic


Neurologic: CNII-XII intact





- Allied Health


Allied health notes reviewed: nursing








- Constitutional


Vitals: 


 











Temp Pulse Resp BP Pulse Ox


 


 98.9 F   80   20   146/97   98 


 


 09/22/18 05:21  09/22/18 05:21  09/22/18 05:21  09/22/18 05:21  09/22/18 05:21











General appearance: Present: no acute distress





Results





- Labs


CBC & Chem 7: 


 09/21/18 05:56





 09/21/18 05:56


Labs: 


 Laboratory Last Values











WBC  14.4 K/mm3 (4.5-11.0)  H  09/21/18  05:56    


 


RBC  3.80 M/mm3 (3.65-5.03)   09/21/18  05:56    


 


Hgb  11.6 gm/dl (11.8-15.2)  L  09/21/18  05:56    


 


Hct  34.9 % (35.5-45.6)  L  09/21/18  05:56    


 


MCV  92 fl (84-94)   09/21/18  05:56    


 


MCH  31 pg (28-32)   09/21/18  05:56    


 


MCHC  33 % (32-34)   09/21/18  05:56    


 


RDW  14.4 % (13.2-15.2)   09/21/18  05:56    


 


Plt Count  262 K/mm3 (140-440)   09/21/18  05:56    


 


Lymph % (Auto)  3.3 % (13.4-35.0)  L  09/20/18  05:13    


 


Mono % (Auto)  7.6 % (0.0-7.3)  H  09/20/18  05:13    


 


Eos % (Auto)  0.0 % (0.0-4.3)   09/20/18  05:13    


 


Baso % (Auto)  0.2 % (0.0-1.8)   09/20/18  05:13    


 


Lymph #  0.4 K/mm3 (1.2-5.4)  L  09/20/18  05:13    


 


Mono #  1.0 K/mm3 (0.0-0.8)  H  09/20/18  05:13    


 


Eos #  0.0 K/mm3 (0.0-0.4)   09/20/18  05:13    


 


Baso #  0.0 K/mm3 (0.0-0.1)   09/20/18  05:13    


 


Seg Neutrophils %  88.9 % (40.0-70.0)  H  09/20/18  05:13    


 


Seg Neutrophils #  11.8 K/mm3 (1.8-7.7)  H  09/20/18  05:13    


 


Sodium  144 mmol/L (137-145)   09/21/18  05:56    


 


Potassium  3.7 mmol/L (3.6-5.0)   09/21/18  05:56    


 


Chloride  107.8 mmol/L ()  H  09/21/18  05:56    


 


Carbon Dioxide  27 mmol/L (22-30)   09/21/18  05:56    


 


Anion Gap  13 mmol/L  09/21/18  05:56    


 


BUN  24 mg/dL (9-20)  H  09/21/18  05:56    


 


Creatinine  0.9 mg/dL (0.8-1.5)   09/21/18  05:56    


 


Estimated GFR  > 60 ml/min  09/21/18  05:56    


 


BUN/Creatinine Ratio  27 %  09/21/18  05:56    


 


Glucose  118 mg/dL ()  H  09/21/18  05:56    


 


Lactic Acid  1.80 mmol/L (0.7-2.0)   09/19/18  14:38    


 


Calcium  8.3 mg/dL (8.4-10.2)  L  09/21/18  05:56    


 


Total Bilirubin  0.90 mg/dL (0.1-1.2)   09/19/18  13:15    


 


AST  16 units/L (5-40)   09/19/18  13:15    


 


ALT  13 units/L (7-56)   09/19/18  13:15    


 


Alkaline Phosphatase  56 units/L ()   09/19/18  13:15    


 


Total Protein  8.2 g/dL (6.3-8.2)   09/19/18  13:15    


 


Albumin  3.4 g/dL (3.9-5)  L  09/19/18  13:15    


 


Albumin/Globulin Ratio  0.7 %  09/19/18  13:15    


 


Urine Color  Yellow  (Yellow)   09/19/18  13:49    


 


Urine Turbidity  Cloudy  (Clear)   09/19/18  13:49    


 


Urine pH  5.0  (5.0-7.0)   09/19/18  13:49    


 


Ur Specific Gravity  1.020  (1.003-1.030)   09/19/18  13:49    


 


Urine Protein  100 mg/dl mg/dL (Negative)   09/19/18  13:49    


 


Urine Glucose (UA)  Neg mg/dL (Negative)   09/19/18  13:49    


 


Urine Ketones  Tr mg/dL (Negative)   09/19/18  13:49    


 


Urine Blood  Mod  (Negative)   09/19/18  13:49    


 


Urine Nitrite  Neg  (Negative)   09/19/18  13:49    


 


Urine Bilirubin  Neg  (Negative)   09/19/18  13:49    


 


Urine Urobilinogen  < 2.0 mg/dL (<2.0)   09/19/18  13:49    


 


Ur Leukocyte Esterase  Neg  (Negative)   09/19/18  13:49    


 


Urine WBC (Auto)  5.0 /HPF (0.0-6.0)   09/19/18  13:49    


 


Urine RBC (Auto)  2.0 /HPF (0.0-6.0)   09/19/18  13:49    


 


U Epithel Cells (Auto)  1.0 /HPF (0-13.0)   09/19/18  13:49    


 


Urine Mucus  Few /HPF  09/19/18  13:49 Take extra water pill if weight gain 5 lbs or more by the end of the week.     We'll try to get your labs from your PCP's office.  If they don't have a BMP, then we'll recommend to get labs done.